# Patient Record
Sex: MALE | Race: WHITE | Employment: UNEMPLOYED | URBAN - METROPOLITAN AREA
[De-identification: names, ages, dates, MRNs, and addresses within clinical notes are randomized per-mention and may not be internally consistent; named-entity substitution may affect disease eponyms.]

---

## 2019-02-06 ENCOUNTER — HOSPITAL ENCOUNTER (OUTPATIENT)
Age: 59
Discharge: HOME | End: 2019-02-06
Payer: COMMERCIAL

## 2019-02-06 ENCOUNTER — HOSPITAL ENCOUNTER (OUTPATIENT)
Dept: ULTRASOUND IMAGING | Age: 59
Discharge: HOME | End: 2019-02-06
Payer: COMMERCIAL

## 2019-02-06 DIAGNOSIS — E87.5 ACUTE HYPERKALEMIA: ICD-10-CM

## 2019-02-06 DIAGNOSIS — N18.30 CHRONIC KIDNEY DISEASE, STAGE III (MODERATE) (HCC): ICD-10-CM

## 2019-02-06 LAB
ALBUMIN SERPL-MCNC: 4.2 GM/DL (ref 3.4–5)
ANION GAP SERPL CALCULATED.3IONS-SCNC: 15 MMOL/L (ref 4–16)
BACTERIA: ABNORMAL /HPF
BASOPHILS ABSOLUTE: 0.1 K/CU MM
BASOPHILS RELATIVE PERCENT: 0.8 % (ref 0–1)
BILIRUBIN URINE: NEGATIVE MG/DL
BLOOD, URINE: ABNORMAL
BUN BLDV-MCNC: 16 MG/DL (ref 6–23)
CALCIUM SERPL-MCNC: 8.8 MG/DL (ref 8.3–10.6)
CHLORIDE BLD-SCNC: 97 MMOL/L (ref 99–110)
CLARITY: CLEAR
CO2: 23 MMOL/L (ref 21–32)
COLOR: COLORLESS
CREAT SERPL-MCNC: 1.5 MG/DL (ref 0.9–1.3)
CREATININE URINE: 36.3 MG/DL (ref 39–259)
DIFFERENTIAL TYPE: ABNORMAL
EOSINOPHILS ABSOLUTE: 0.2 K/CU MM
EOSINOPHILS RELATIVE PERCENT: 1.5 % (ref 0–3)
GFR AFRICAN AMERICAN: 58 ML/MIN/1.73M2
GFR NON-AFRICAN AMERICAN: 48 ML/MIN/1.73M2
GLUCOSE BLD-MCNC: 85 MG/DL (ref 70–99)
GLUCOSE, URINE: NEGATIVE MG/DL
HAV IGM SER IA-ACNC: NON REACTIVE
HCT VFR BLD CALC: 42.3 % (ref 42–52)
HEMOGLOBIN: 13.2 GM/DL (ref 13.5–18)
HEPATITIS B CORE IGM ANTIBODY: NON REACTIVE
HEPATITIS B SURFACE ANTIGEN: NON REACTIVE
HEPATITIS C ANTIBODY: NON REACTIVE
IMMATURE NEUTROPHIL %: 0.7 % (ref 0–0.43)
KETONES, URINE: NEGATIVE MG/DL
LEUKOCYTE ESTERASE, URINE: NEGATIVE
LYMPHOCYTES ABSOLUTE: 3.2 K/CU MM
LYMPHOCYTES RELATIVE PERCENT: 27 % (ref 24–44)
MCH RBC QN AUTO: 29.3 PG (ref 27–31)
MCHC RBC AUTO-ENTMCNC: 31.2 % (ref 32–36)
MCV RBC AUTO: 94 FL (ref 78–100)
MONOCYTES ABSOLUTE: 1.1 K/CU MM
MONOCYTES RELATIVE PERCENT: 9 % (ref 0–4)
MUCUS: ABNORMAL HPF
NITRITE URINE, QUANTITATIVE: NEGATIVE
NUCLEATED RBC %: 0 %
PDW BLD-RTO: 13.2 % (ref 11.7–14.9)
PH, URINE: 6 (ref 5–8)
PHOSPHORUS: 4.5 MG/DL (ref 2.5–4.9)
PLATELET # BLD: 330 K/CU MM (ref 140–440)
PMV BLD AUTO: 9.7 FL (ref 7.5–11.1)
POTASSIUM SERPL-SCNC: 4.5 MMOL/L (ref 3.5–5.1)
PROLACTIN: 13.2 NG/ML
PROSTATE SPECIFIC ANTIGEN: 1 NG/ML (ref 0–4)
PROT/CREAT RATIO, UR: 0.1
PROTEIN UA: NEGATIVE MG/DL
RBC # BLD: 4.5 M/CU MM (ref 4.6–6.2)
RBC URINE: <1 /HPF (ref 0–3)
SEGMENTED NEUTROPHILS ABSOLUTE COUNT: 7.3 K/CU MM
SEGMENTED NEUTROPHILS RELATIVE PERCENT: 61 % (ref 36–66)
SODIUM BLD-SCNC: 135 MMOL/L (ref 135–145)
SPECIFIC GRAVITY UA: 1 (ref 1–1.03)
SQUAMOUS EPITHELIAL: <1 /HPF
TOTAL IMMATURE NEUTOROPHIL: 0.08 K/CU MM
TOTAL NUCLEATED RBC: 0 K/CU MM
TRANSITIONAL EPITHELIAL: <1 /HPF
TRICHOMONAS: ABNORMAL /HPF
URINE TOTAL PROTEIN: 4 MG/DL
UROBILINOGEN, URINE: NORMAL MG/DL (ref 0.2–1)
WBC # BLD: 12 K/CU MM (ref 4–10.5)
WBC UA: 1 /HPF (ref 0–2)

## 2019-02-06 PROCEDURE — 84156 ASSAY OF PROTEIN URINE: CPT

## 2019-02-06 PROCEDURE — 36415 COLL VENOUS BLD VENIPUNCTURE: CPT

## 2019-02-06 PROCEDURE — 84165 PROTEIN E-PHORESIS SERUM: CPT

## 2019-02-06 PROCEDURE — 84146 ASSAY OF PROLACTIN: CPT

## 2019-02-06 PROCEDURE — 76770 US EXAM ABDO BACK WALL COMP: CPT

## 2019-02-06 PROCEDURE — G0103 PSA SCREENING: HCPCS

## 2019-02-06 PROCEDURE — 80074 ACUTE HEPATITIS PANEL: CPT

## 2019-02-06 PROCEDURE — 85025 COMPLETE CBC W/AUTO DIFF WBC: CPT

## 2019-02-06 PROCEDURE — 82570 ASSAY OF URINE CREATININE: CPT

## 2019-02-06 PROCEDURE — 81001 URINALYSIS AUTO W/SCOPE: CPT

## 2019-02-06 PROCEDURE — 80069 RENAL FUNCTION PANEL: CPT

## 2019-02-08 LAB
ALBUMIN ELP: 3.5 GM/DL (ref 3.2–5.6)
ALPHA-1-GLOBULIN: 0.3 GM/DL (ref 0.1–0.4)
ALPHA-2-GLOBULIN: 0.8 GM/DL (ref 0.4–1.2)
BETA GLOBULIN: 1 GM/DL (ref 0.5–1.3)
GAMMA GLOBULIN: 1 GM/DL (ref 0.5–1.6)
TOTAL PROTEIN: 6.7 GM/DL (ref 6.4–8.2)

## 2022-08-17 ENCOUNTER — APPOINTMENT (OUTPATIENT)
Dept: CT IMAGING | Age: 62
End: 2022-08-17
Payer: COMMERCIAL

## 2022-08-17 ENCOUNTER — HOSPITAL ENCOUNTER (EMERGENCY)
Age: 62
Discharge: HOME | End: 2022-08-17
Attending: STUDENT IN AN ORGANIZED HEALTH CARE EDUCATION/TRAINING PROGRAM
Payer: COMMERCIAL

## 2022-08-17 VITALS
TEMPERATURE: 98.4 F | HEIGHT: 66 IN | SYSTOLIC BLOOD PRESSURE: 145 MMHG | HEART RATE: 96 BPM | WEIGHT: 158 LBS | DIASTOLIC BLOOD PRESSURE: 101 MMHG | OXYGEN SATURATION: 98 % | BODY MASS INDEX: 25.39 KG/M2 | RESPIRATION RATE: 18 BRPM

## 2022-08-17 DIAGNOSIS — G40.919 BREAKTHROUGH SEIZURE (HCC): Primary | ICD-10-CM

## 2022-08-17 LAB
ALBUMIN SERPL-MCNC: 4.9 GM/DL (ref 3.4–5)
ALP BLD-CCNC: 126 IU/L (ref 40–129)
ALT SERPL-CCNC: 22 U/L (ref 10–40)
ANION GAP SERPL CALCULATED.3IONS-SCNC: 20 MMOL/L (ref 4–16)
APTT: 25.3 SECONDS (ref 25.1–37.1)
AST SERPL-CCNC: 25 IU/L (ref 15–37)
BASOPHILS ABSOLUTE: 0.1 K/CU MM
BASOPHILS RELATIVE PERCENT: 0.8 % (ref 0–1)
BILIRUB SERPL-MCNC: 0.4 MG/DL (ref 0–1)
BUN BLDV-MCNC: 10 MG/DL (ref 6–23)
CALCIUM SERPL-MCNC: 10.1 MG/DL (ref 8.3–10.6)
CHLORIDE BLD-SCNC: 92 MMOL/L (ref 99–110)
CO2: 18 MMOL/L (ref 21–32)
CREAT SERPL-MCNC: 1.3 MG/DL (ref 0.9–1.3)
DIFFERENTIAL TYPE: ABNORMAL
EOSINOPHILS ABSOLUTE: 0.1 K/CU MM
EOSINOPHILS RELATIVE PERCENT: 1.2 % (ref 0–3)
GFR AFRICAN AMERICAN: >60 ML/MIN/1.73M2
GFR NON-AFRICAN AMERICAN: 56 ML/MIN/1.73M2
GLUCOSE BLD-MCNC: 125 MG/DL (ref 70–99)
GLUCOSE BLD-MCNC: 132 MG/DL (ref 70–99)
HCT VFR BLD CALC: 41 % (ref 42–52)
HEMOGLOBIN: 14.1 GM/DL (ref 13.5–18)
IMMATURE NEUTROPHIL %: 0.8 % (ref 0–0.43)
INR BLD: 0.93 INDEX
LYMPHOCYTES ABSOLUTE: 3.8 K/CU MM
LYMPHOCYTES RELATIVE PERCENT: 32.6 % (ref 24–44)
MAGNESIUM: 1.6 MG/DL (ref 1.8–2.4)
MCH RBC QN AUTO: 30.3 PG (ref 27–31)
MCHC RBC AUTO-ENTMCNC: 34.4 % (ref 32–36)
MCV RBC AUTO: 88.2 FL (ref 78–100)
MONOCYTES ABSOLUTE: 1.1 K/CU MM
MONOCYTES RELATIVE PERCENT: 9.1 % (ref 0–4)
NUCLEATED RBC %: 0 %
PDW BLD-RTO: 13.2 % (ref 11.7–14.9)
PLATELET # BLD: 364 K/CU MM (ref 140–440)
PMV BLD AUTO: 9.4 FL (ref 7.5–11.1)
POTASSIUM SERPL-SCNC: 3.5 MMOL/L (ref 3.5–5.1)
PROTHROMBIN TIME: 12 SECONDS (ref 11.7–14.5)
RBC # BLD: 4.65 M/CU MM (ref 4.6–6.2)
SEGMENTED NEUTROPHILS ABSOLUTE COUNT: 6.6 K/CU MM
SEGMENTED NEUTROPHILS RELATIVE PERCENT: 55.5 % (ref 36–66)
SODIUM BLD-SCNC: 130 MMOL/L (ref 135–145)
TOTAL IMMATURE NEUTOROPHIL: 0.09 K/CU MM
TOTAL NUCLEATED RBC: 0 K/CU MM
TOTAL PROTEIN: 8.1 GM/DL (ref 6.4–8.2)
TROPONIN T: <0.01 NG/ML
WBC # BLD: 11.8 K/CU MM (ref 4–10.5)

## 2022-08-17 PROCEDURE — 83735 ASSAY OF MAGNESIUM: CPT

## 2022-08-17 PROCEDURE — 82962 GLUCOSE BLOOD TEST: CPT

## 2022-08-17 PROCEDURE — 85730 THROMBOPLASTIN TIME PARTIAL: CPT

## 2022-08-17 PROCEDURE — 93005 ELECTROCARDIOGRAM TRACING: CPT | Performed by: STUDENT IN AN ORGANIZED HEALTH CARE EDUCATION/TRAINING PROGRAM

## 2022-08-17 PROCEDURE — 85610 PROTHROMBIN TIME: CPT

## 2022-08-17 PROCEDURE — 70450 CT HEAD/BRAIN W/O DYE: CPT

## 2022-08-17 PROCEDURE — 99284 EMERGENCY DEPT VISIT MOD MDM: CPT

## 2022-08-17 PROCEDURE — 80053 COMPREHEN METABOLIC PANEL: CPT

## 2022-08-17 PROCEDURE — 85025 COMPLETE CBC W/AUTO DIFF WBC: CPT

## 2022-08-17 PROCEDURE — 84484 ASSAY OF TROPONIN QUANT: CPT

## 2022-08-17 RX ORDER — ATORVASTATIN CALCIUM 80 MG/1
TABLET, FILM COATED ORAL
COMMUNITY
Start: 2022-08-08

## 2022-08-17 RX ORDER — ALPRAZOLAM 0.5 MG/1
TABLET ORAL
COMMUNITY
Start: 2022-08-05

## 2022-08-17 RX ORDER — AMITRIPTYLINE HYDROCHLORIDE 100 MG/1
TABLET, FILM COATED ORAL
COMMUNITY

## 2022-08-17 RX ORDER — OMEPRAZOLE 20 MG/1
CAPSULE, DELAYED RELEASE ORAL
COMMUNITY

## 2022-08-17 RX ORDER — OMEGA-3-ACID ETHYL ESTERS 1 G/1
CAPSULE, LIQUID FILLED ORAL
COMMUNITY
Start: 2022-08-05

## 2022-08-17 RX ORDER — ADHESIVE TAPE 3"X 2.3 YD
1 TAPE, NON-MEDICATED TOPICAL DAILY
Qty: 5 TABLET | Refills: 0 | Status: SHIPPED | OUTPATIENT
Start: 2022-08-17 | End: 2022-09-08

## 2022-08-17 RX ORDER — POLYETHYLENE GLYCOL 3350 17 G/17G
POWDER, FOR SOLUTION ORAL
COMMUNITY

## 2022-08-17 RX ORDER — CARVEDILOL 12.5 MG/1
TABLET ORAL
COMMUNITY
Start: 2022-07-26

## 2022-08-17 RX ORDER — HYDROCHLOROTHIAZIDE 12.5 MG/1
CAPSULE, GELATIN COATED ORAL
COMMUNITY
Start: 2022-07-26

## 2022-08-17 RX ORDER — FLUTICASONE FUROATE, UMECLIDINIUM BROMIDE AND VILANTEROL TRIFENATATE 100; 62.5; 25 UG/1; UG/1; UG/1
POWDER RESPIRATORY (INHALATION)
COMMUNITY
Start: 2022-08-05

## 2022-08-17 RX ORDER — LORATADINE 10 MG/1
TABLET ORAL
COMMUNITY
Start: 2022-08-04

## 2022-08-17 ASSESSMENT — PAIN - FUNCTIONAL ASSESSMENT: PAIN_FUNCTIONAL_ASSESSMENT: NONE - DENIES PAIN

## 2022-08-17 NOTE — ED PROVIDER NOTES
Emergency Department Encounter    Patient: Janelle Ragland  MRN: [id number]  : 1960  Date of Evaluation: 2022  ED Provider:  Ponce Phillips DO    Triage Chief Complaint:   Seizures    [place]:  Janelle Ragland is a 59 y.o. male with a past medical history of bipolar disorder, hypertension, hyperlipidemia, that presents to the ED with a history of a witnessed seizure episode. Sister and partner by the bedside and assisting with the history. Patient seen having a generalized tonic-clonic seizures before being helped to the bed. Patient does not remember exactly what happened to him. Patient denies any headache, dizziness, focal neurologic deficits, fever, or any recent history of similar symptoms. ROS - see HPI, below listed is current ROS at time of my eval:  General:  No fevers, no chills, no weakness  Eyes:  No recent vison changes, no discharge  ENT:  No sore throat, no nasal congestion, no hearing changes  Cardiovascular:  No chest pain, no palpitations  Respiratory:  No shortness of breath, no cough, no wheezing  Gastrointestinal:  No pain, no nausea, no vomiting, no diarrhea  Musculoskeletal:  No muscle pain, no joint pain  Skin:  No rash, no pruritis, no easy bruising  Neurologic: Seizure  Psychiatric:  No anxiety  Genitourinary:  No dysuria, no hematuria  Endocrine:  No unexpected weight gain, no unexpected weight loss  Extremities:  no edema, no pain    Past Medical History:   Diagnosis Date    Bipolar 1 disorder ([facility] [address]     Hyperlipidemia     Hypertension     Seizures ([facility] [address]      Past Surgical History:   Procedure Laterality Date    KIDNEY STONE REMOVAL       History reviewed. No pertinent family history.   Social History     Socioeconomic History    Marital status:      Spouse name: Not on file    Number of children: Not on file    Years of education: Not on file    Highest education level: Not on file   Occupational History    Not on file   Tobacco Use    Smoking status: Every Day    Smokeless tobacco: Never   Vaping Use    Vaping Use: Never used   Substance and Sexual Activity    Alcohol use: No    Drug use: Yes     Frequency: 1.0 times per week     Types: Marijuana [place])    Sexual activity: Not on file   Other Topics Concern    Not on file   Social History Narrative    Not on file     Social Determinants of Health     Financial Resource Strain: Not on file   Food Insecurity: Not on file   Transportation Needs: Not on file   Physical Activity: Not on file   Stress: Not on file   Social Connections: Not on file   Intimate Partner Violence: Not on file   Housing Stability: Not on file     No current facility-administered medications for this encounter. Current Outpatient Medications   Medication Sig Dispense Refill    ALPRAZolam (XANAX) 0.5 MG tablet TAKE ONE TABLET BY MOUTH TWICE DAILY      amitriptyline (ELAVIL) 100 MG tablet amitriptyline 100 mg tablet      atorvastatin (LIPITOR) 80 MG tablet take one tablet by mouth EVERY DAY      carvedilol (COREG) 12.5 MG tablet take one tablet by mouth TWICE DAILY      TRELEGY ELLIPTA 100-62.5-25 MCG/INH AEPB INHALE 1 PUFF BY MOUTH ONCE EVERY DAY      hydroCHLOROthiazide (MICROZIDE) 12.5 MG capsule TAKE ONE CAPSULE BY MOUTH EVERY DAY      HM LORATADINE 10 MG tablet TAKE ONE TABLET BY MOUTH EVERY DAY      omega-3 acid ethyl esters (LOVAZA) 1 g capsule TAKE TWO CAPSULES BY MOUTH TWICE DAILY      omeprazole (PRILOSEC) 20 MG delayed release capsule omeprazole 20 mg capsule,delayed release      polyethylene glycol (GLYCOLAX) 17 GM/SCOOP powder polyethylene glycol 3350 17 gram/dose oral powder      triamcinolone (KENALOG) 0.1 % ointment triamcinolone acetonide 0.1 % topical ointment      ALPRAZolam (XANAX) 1 MG tablet Take 1 mg by mouth. amitriptyline (ELAVIL) 10 MG tablet Take 10 mg by mouth nightly. PARoxetine (PAXIL) 10 MG/5ML suspension Take  by mouth every morning.        Allergies   Allergen Reactions    Risperdal [Risperidone] Nursing Notes Reviewed    Physical Exam:  Triage VS:    ED Triage Vitals [08/17/22 1249]   Enc Vitals Group      BP (!) 152/91      Heart Rate (!) 102      Resp 18      Temp 98.4 °F (36.9 °C)      Temp Source Oral      SpO2 97 %      Weight 158 lb (71.7 kg)      Height 5' 6\" (1.676 m)      Head Circumference       Peak Flow       Pain Score       Pain Loc       Pain Edu? Excl. in [address]? My pulse ox interpretation is - normal    General appearance:  No acute distress. Skin:  Warm. Dry. Eye:  Extraocular movements intact. Ears, nose, mouth and throat:  Oral mucosa moist   Neck:  Trachea midline. Extremity:  No swelling. Normal ROM     Heart:  Regular rate and rhythm, normal S1 & S2, no extra heart sounds. Perfusion:  intact  Respiratory:  Lungs clear to auscultation bilaterally. Respirations nonlabored. Abdominal:  Normal bowel sounds. Soft. Nontender. Non distended. Back:  No CVA tenderness to palpation     Neurological:  Alert and oriented times 3. No focal neuro deficits.              Psychiatric:  Appropriate    I have reviewed and interpreted all of the currently available lab results from this visit (if applicable):  Results for orders placed or performed during the hospital encounter of 08/17/22   CBC with Auto Differential   Result Value Ref Range    WBC 11.8 (H) 4.0 - 10.5 K/CU MM    RBC 4.65 4.6 - 6.2 M/CU MM    Hemoglobin 14.1 13.5 - 18.0 GM/DL    Hematocrit 41.0 (L) 42 - 52 %    MCV 88.2 78 - 100 FL    MCH 30.3 27 - 31 PG    MCHC 34.4 32.0 - 36.0 %    RDW 13.2 11.7 - 14.9 %    Platelets 323 039 - 823 K/CU MM    MPV 9.4 7.5 - 11.1 FL    Differential Type AUTOMATED DIFFERENTIAL     Segs Relative 55.5 36 - 66 %    Lymphocytes % 32.6 24 - 44 %    Monocytes % 9.1 (H) 0 - 4 %    Eosinophils % 1.2 0 - 3 %    Basophils % 0.8 0 - 1 %    Segs Absolute 6.6 K/CU MM    Lymphocytes Absolute 3.8 K/CU MM    Monocytes Absolute 1.1 K/CU MM    Eosinophils Absolute 0.1 K/CU MM Basophils Absolute 0.1 K/CU MM    Nucleated RBC % 0.0 %    Total Nucleated RBC 0.0 K/CU MM    Total Immature Neutrophil 0.09 K/CU MM    Immature Neutrophil % 0.8 (H) 0 - 0.43 %   Comprehensive Metabolic Panel w/ Reflex to MG   Result Value Ref Range    Sodium 130 (L) 135 - 145 MMOL/L    Potassium 3.5 3.5 - 5.1 MMOL/L    Chloride 92 (L) 99 - 110 mMol/L    CO2 18 (L) 21 - 32 MMOL/L    BUN 10 6 - 23 MG/DL    Creatinine 1.3 0.9 - 1.3 MG/DL    Glucose 132 (H) 70 - 99 MG/DL    Calcium 10.1 8.3 - 10.6 MG/DL    Albumin 4.9 3.4 - 5.0 GM/DL    Total Protein 8.1 6.4 - 8.2 GM/DL    Total Bilirubin 0.4 0.0 - 1.0 MG/DL    ALT 22 10 - 40 U/L    AST 25 15 - 37 IU/L    Alkaline Phosphatase 126 40 - 129 IU/L    GFR Non- 56 (L) >60 mL/min/1.73m2    GFR African American >60 >60 mL/min/1.73m2    Anion Gap 20 (H) 4 - 16   Troponin   Result Value Ref Range    Troponin T <0.010 <0.01 NG/ML   Protime-INR   Result Value Ref Range    Protime 12.0 11.7 - 14.5 SECONDS    INR 0.93 INDEX   APTT   Result Value Ref Range    aPTT 25.3 25.1 - 37.1 SECONDS   POCT Glucose   Result Value Ref Range    POC Glucose 125 (H) 70 - 99 MG/DL      Radiographs (if obtained):  Radiologist's Report Reviewed:  CT Head WO Contrast   Final Result   No acute intracranial abnormality. Visualized maxillary sinuses demonstrate opacification in keeping with   sinusitis. EKG (if obtained): (All EKG's are interpreted by myself in the absence of a cardiologist)      MDM:  40-year-old male with a past medical history of hypertension hyperlipidemia and a history of seizures but has not had any seizure in over 8 years presented to the ED with a history of a seizure episode. Patient also said to have been clammy and diaphoretic during the seizure. Physical examination suggestive of a postictal state with generalized weakness but no focal neurologic deficits. Is a patient scheduled for CT scan of the head which came up unremarkable.   Blood is drawn for labs    Patient care endorsed to Dr. Mike Mcbride    Clinical Impression:  1. Breakthrough seizure ([facility] [address]      Disposition referral (if applicable):  No follow-up provider specified. Disposition medications (if applicable):  New Prescriptions    No medications on file     ED Provider Disposition Time  DISPOSITION        Comment: Please note this report has been produced using speech recognition software and may contain errors related to that system including errors in grammar, punctuation, and spelling, as well as words and phrases that may be inappropriate. Efforts were made to edit the dictations.         Pippa Donnelly DO  08/17/22 [id number]

## 2022-08-17 NOTE — ED PROVIDER NOTES
Christo Rodríguez was checked out to me by Dr. Orlando Jaquez. Please see his/her initial documentation for details of the patient's ED presentation, physical exam and completed studies. In brief, Christo Rodríguez is a 59 y.o. male that presents with potential breakthrough seizure.     Labs  Results for orders placed or performed during the hospital encounter of 08/17/22   CBC with Auto Differential   Result Value Ref Range    WBC 11.8 (H) 4.0 - 10.5 K/CU MM    RBC 4.65 4.6 - 6.2 M/CU MM    Hemoglobin 14.1 13.5 - 18.0 GM/DL    Hematocrit 41.0 (L) 42 - 52 %    MCV 88.2 78 - 100 FL    MCH 30.3 27 - 31 PG    MCHC 34.4 32.0 - 36.0 %    RDW 13.2 11.7 - 14.9 %    Platelets 148 645 - 822 K/CU MM    MPV 9.4 7.5 - 11.1 FL    Differential Type AUTOMATED DIFFERENTIAL     Segs Relative 55.5 36 - 66 %    Lymphocytes % 32.6 24 - 44 %    Monocytes % 9.1 (H) 0 - 4 %    Eosinophils % 1.2 0 - 3 %    Basophils % 0.8 0 - 1 %    Segs Absolute 6.6 K/CU MM    Lymphocytes Absolute 3.8 K/CU MM    Monocytes Absolute 1.1 K/CU MM    Eosinophils Absolute 0.1 K/CU MM    Basophils Absolute 0.1 K/CU MM    Nucleated RBC % 0.0 %    Total Nucleated RBC 0.0 K/CU MM    Total Immature Neutrophil 0.09 K/CU MM    Immature Neutrophil % 0.8 (H) 0 - 0.43 %   Comprehensive Metabolic Panel w/ Reflex to MG   Result Value Ref Range    Sodium 130 (L) 135 - 145 MMOL/L    Potassium 3.5 3.5 - 5.1 MMOL/L    Chloride 92 (L) 99 - 110 mMol/L    CO2 18 (L) 21 - 32 MMOL/L    BUN 10 6 - 23 MG/DL    Creatinine 1.3 0.9 - 1.3 MG/DL    Glucose 132 (H) 70 - 99 MG/DL    Calcium 10.1 8.3 - 10.6 MG/DL    Albumin 4.9 3.4 - 5.0 GM/DL    Total Protein 8.1 6.4 - 8.2 GM/DL    Total Bilirubin 0.4 0.0 - 1.0 MG/DL    ALT 22 10 - 40 U/L    AST 25 15 - 37 IU/L    Alkaline Phosphatase 126 40 - 129 IU/L    GFR Non- 56 (L) >60 mL/min/1.73m2    GFR African American >60 >60 mL/min/1.73m2    Anion Gap 20 (H) 4 - 16   Troponin   Result Value Ref Range    Troponin T <0.010 <0.01 NG/ML

## 2022-08-17 NOTE — ED NOTES
Discussed patient's prescriptions with patient. No further questions at this time. Instructed patient to call neurology clinic to schedule follow up appointment. Provided patient with phone number and address for neuro clinic. No further questions at this time. Ambulatory at discharge with significant other.       Dolly Silva RN  08/17/22 [id number]

## 2022-08-17 NOTE — ED NOTES
Pt given water with permission from primary RN and Dr. Casandra Quintana, RN  08/17/22 [phone]

## 2022-08-17 NOTE — DISCHARGE INSTRUCTIONS
Please return to the emergency department if you change your mind regarding admission or if symptoms worsen. Please call neurologist tomorrow to schedule appointment even if it is a few months out.

## 2022-08-17 NOTE — ED TRIAGE NOTES
Patient does not remember what happened. Patient's family member says that patient was standing, started 'giggling' and did not recognize her and could not tell her his name. Family said that then he fell onto the bed and she moved him onto his side because he had started shaking. Patient is alert and oriented on arrival to ED. Patient has hx of seizures but has not had one in at least 8 years.

## 2022-08-18 LAB
EKG ATRIAL RATE: 86 BPM
EKG DIAGNOSIS: NORMAL
EKG P AXIS: 38 DEGREES
EKG P-R INTERVAL: 174 MS
EKG Q-T INTERVAL: 384 MS
EKG QRS DURATION: 92 MS
EKG QTC CALCULATION (BAZETT): 459 MS
EKG R AXIS: -34 DEGREES
EKG T AXIS: 21 DEGREES
EKG VENTRICULAR RATE: 86 BPM

## 2022-08-18 PROCEDURE — 93010 ELECTROCARDIOGRAM REPORT: CPT | Performed by: INTERNAL MEDICINE

## 2022-09-08 ENCOUNTER — OFFICE VISIT (OUTPATIENT)
Dept: NEUROLOGY | Age: 62
End: 2022-09-08
Payer: COMMERCIAL

## 2022-09-08 ENCOUNTER — TELEPHONE (OUTPATIENT)
Dept: NEUROLOGY | Age: 62
End: 2022-09-08

## 2022-09-08 VITALS
OXYGEN SATURATION: 95 % | HEART RATE: 73 BPM | WEIGHT: 154.6 LBS | SYSTOLIC BLOOD PRESSURE: 132 MMHG | DIASTOLIC BLOOD PRESSURE: 80 MMHG | BODY MASS INDEX: 24.85 KG/M2 | HEIGHT: 66 IN

## 2022-09-08 DIAGNOSIS — R56.9 SEIZURES (HCC): Primary | ICD-10-CM

## 2022-09-08 PROCEDURE — 99245 OFF/OP CONSLTJ NEW/EST HI 55: CPT | Performed by: PSYCHIATRY & NEUROLOGY

## 2022-09-08 PROCEDURE — G8427 DOCREV CUR MEDS BY ELIG CLIN: HCPCS | Performed by: PSYCHIATRY & NEUROLOGY

## 2022-09-08 PROCEDURE — G8420 CALC BMI NORM PARAMETERS: HCPCS | Performed by: PSYCHIATRY & NEUROLOGY

## 2022-09-08 RX ORDER — ERGOCALCIFEROL 1.25 MG/1
CAPSULE ORAL
COMMUNITY
Start: 2022-08-24

## 2022-09-08 RX ORDER — AMITRIPTYLINE HYDROCHLORIDE 150 MG/1
TABLET, FILM COATED ORAL
COMMUNITY
Start: 2022-09-03

## 2022-09-20 ENCOUNTER — HOSPITAL ENCOUNTER (OUTPATIENT)
Dept: SLEEP CENTER | Age: 62
Discharge: HOME | End: 2022-09-20
Payer: COMMERCIAL

## 2022-09-20 DIAGNOSIS — R56.9 SEIZURES (HCC): ICD-10-CM

## 2022-09-20 PROCEDURE — 95819 EEG AWAKE AND ASLEEP: CPT

## 2022-09-27 PROCEDURE — 95816 EEG AWAKE AND DROWSY: CPT | Performed by: STUDENT IN AN ORGANIZED HEALTH CARE EDUCATION/TRAINING PROGRAM

## 2022-09-27 NOTE — PROCEDURES
channels of EEG were recorded in a digital format on a patient who was reported to be awake and drowsy during the recording. The patient was not sleep deprived prior to the EEG. The PDR consisted of well-developed, well-regulated 8-9 Hz alpha activity, maximal over the posterior head regions and reactive to eye opening and closure. Photic stimulation was performed and did not produce any abnormalities. During the recording stage II sleep was not seen, but drowsiness did occur. The EKG lead revealed no rhythm abnormalities. EEG Interpretation: This EEG was within normal limits for a patient of this age in the awake and drowsy states. No focal, lateralizing, or epileptiform features were seen during the recording. Clinical correlation is recommended.     Gadiel Reilly DO  Epileptologist  9/27/2022 11:46 AM
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
## 2022-11-13 ENCOUNTER — HOSPITAL ENCOUNTER (OUTPATIENT)
Age: 62
Setting detail: OBSERVATION
Discharge: HOME | End: 2022-11-15
Attending: EMERGENCY MEDICINE
Payer: COMMERCIAL

## 2022-11-13 ENCOUNTER — APPOINTMENT (OUTPATIENT)
Dept: GENERAL RADIOLOGY | Age: 62
End: 2022-11-13
Payer: COMMERCIAL

## 2022-11-13 ENCOUNTER — APPOINTMENT (OUTPATIENT)
Dept: CT IMAGING | Age: 62
End: 2022-11-13
Payer: COMMERCIAL

## 2022-11-13 DIAGNOSIS — E83.42 HYPOMAGNESEMIA: ICD-10-CM

## 2022-11-13 DIAGNOSIS — F41.9 ANXIETY: ICD-10-CM

## 2022-11-13 DIAGNOSIS — R56.9 SEIZURE (HCC): Primary | ICD-10-CM

## 2022-11-13 DIAGNOSIS — G40.909 SEIZURE DISORDER (HCC): ICD-10-CM

## 2022-11-13 DIAGNOSIS — N17.9 ACUTE KIDNEY INJURY (HCC): ICD-10-CM

## 2022-11-13 DIAGNOSIS — I10 ESSENTIAL HYPERTENSION: ICD-10-CM

## 2022-11-13 LAB
ALBUMIN SERPL-MCNC: 4.5 GM/DL (ref 3.4–5)
ALCOHOL SCREEN SERUM: <0.01 %WT/VOL
ALP BLD-CCNC: 129 IU/L (ref 40–129)
ALT SERPL-CCNC: 28 U/L (ref 10–40)
AMPHETAMINES: NEGATIVE
ANION GAP SERPL CALCULATED.3IONS-SCNC: 23 MMOL/L (ref 4–16)
AST SERPL-CCNC: 29 IU/L (ref 15–37)
BACTERIA: NEGATIVE /HPF
BARBITURATE SCREEN URINE: NEGATIVE
BASOPHILS ABSOLUTE: 0.1 K/CU MM
BASOPHILS RELATIVE PERCENT: 0.7 % (ref 0–1)
BENZODIAZEPINE SCREEN, URINE: NEGATIVE
BILIRUB SERPL-MCNC: 0.5 MG/DL (ref 0–1)
BILIRUBIN URINE: NEGATIVE MG/DL
BLOOD, URINE: ABNORMAL
BUN BLDV-MCNC: 9 MG/DL (ref 6–23)
CALCIUM SERPL-MCNC: 10.2 MG/DL (ref 8.3–10.6)
CANNABINOID SCREEN URINE: ABNORMAL
CHLORIDE BLD-SCNC: 93 MMOL/L (ref 99–110)
CLARITY: CLEAR
CO2: 17 MMOL/L (ref 21–32)
COCAINE METABOLITE: NEGATIVE
COLOR: YELLOW
CREAT SERPL-MCNC: 1.4 MG/DL (ref 0.9–1.3)
DIFFERENTIAL TYPE: ABNORMAL
EOSINOPHILS ABSOLUTE: 0.2 K/CU MM
EOSINOPHILS RELATIVE PERCENT: 1.7 % (ref 0–3)
GFR SERPL CREATININE-BSD FRML MDRD: 56 ML/MIN/1.73M2
GLUCOSE BLD-MCNC: 158 MG/DL (ref 70–99)
GLUCOSE, URINE: NEGATIVE MG/DL
HCT VFR BLD CALC: 40.5 % (ref 42–52)
HEMOGLOBIN: 13.7 GM/DL (ref 13.5–18)
IMMATURE NEUTROPHIL %: 0.9 % (ref 0–0.43)
KETONES, URINE: NEGATIVE MG/DL
LACTATE: 1.4 MMOL/L (ref 0.4–2)
LACTATE: 7.8 MMOL/L (ref 0.4–2)
LEUKOCYTE ESTERASE, URINE: NEGATIVE
LYMPHOCYTES ABSOLUTE: 3.8 K/CU MM
LYMPHOCYTES RELATIVE PERCENT: 27.5 % (ref 24–44)
MAGNESIUM: 1.6 MG/DL (ref 1.8–2.4)
MAGNESIUM: 1.8 MG/DL (ref 1.8–2.4)
MCH RBC QN AUTO: 29.6 PG (ref 27–31)
MCHC RBC AUTO-ENTMCNC: 33.8 % (ref 32–36)
MCV RBC AUTO: 87.5 FL (ref 78–100)
MONOCYTES ABSOLUTE: 1.1 K/CU MM
MONOCYTES RELATIVE PERCENT: 7.7 % (ref 0–4)
MUCUS: ABNORMAL HPF
NITRITE URINE, QUANTITATIVE: NEGATIVE
NUCLEATED RBC %: 0 %
OPIATES, URINE: NEGATIVE
OXYCODONE: NEGATIVE
PDW BLD-RTO: 13.6 % (ref 11.7–14.9)
PH, URINE: 7 (ref 5–8)
PHENCYCLIDINE, URINE: NEGATIVE
PLATELET # BLD: 353 K/CU MM (ref 140–440)
PMV BLD AUTO: 8.7 FL (ref 7.5–11.1)
POTASSIUM SERPL-SCNC: 3.9 MMOL/L (ref 3.5–5.1)
PROTEIN UA: NEGATIVE MG/DL
RBC # BLD: 4.63 M/CU MM (ref 4.6–6.2)
RBC URINE: 2 /HPF (ref 0–3)
SEGMENTED NEUTROPHILS ABSOLUTE COUNT: 8.5 K/CU MM
SEGMENTED NEUTROPHILS RELATIVE PERCENT: 61.5 % (ref 36–66)
SODIUM BLD-SCNC: 133 MMOL/L (ref 135–145)
SPECIFIC GRAVITY UA: 1.02 (ref 1–1.03)
TOTAL IMMATURE NEUTOROPHIL: 0.13 K/CU MM
TOTAL NUCLEATED RBC: 0 K/CU MM
TOTAL PROTEIN: 8 GM/DL (ref 6.4–8.2)
TRICHOMONAS: ABNORMAL /HPF
UROBILINOGEN, URINE: 0.2 MG/DL (ref 0.2–1)
WBC # BLD: 13.8 K/CU MM (ref 4–10.5)
WBC UA: <1 /HPF (ref 0–2)

## 2022-11-13 PROCEDURE — 6360000002 HC RX W HCPCS

## 2022-11-13 PROCEDURE — G0480 DRUG TEST DEF 1-7 CLASSES: HCPCS

## 2022-11-13 PROCEDURE — 96366 THER/PROPH/DIAG IV INF ADDON: CPT

## 2022-11-13 PROCEDURE — 6360000002 HC RX W HCPCS: Performed by: EMERGENCY MEDICINE

## 2022-11-13 PROCEDURE — 80053 COMPREHEN METABOLIC PANEL: CPT

## 2022-11-13 PROCEDURE — 81003 URINALYSIS AUTO W/O SCOPE: CPT

## 2022-11-13 PROCEDURE — 96372 THER/PROPH/DIAG INJ SC/IM: CPT

## 2022-11-13 PROCEDURE — 83605 ASSAY OF LACTIC ACID: CPT

## 2022-11-13 PROCEDURE — 80307 DRUG TEST PRSMV CHEM ANLYZR: CPT

## 2022-11-13 PROCEDURE — 96367 TX/PROPH/DG ADDL SEQ IV INF: CPT

## 2022-11-13 PROCEDURE — 83735 ASSAY OF MAGNESIUM: CPT

## 2022-11-13 PROCEDURE — 96365 THER/PROPH/DIAG IV INF INIT: CPT

## 2022-11-13 PROCEDURE — 2580000003 HC RX 258: Performed by: EMERGENCY MEDICINE

## 2022-11-13 PROCEDURE — 96375 TX/PRO/DX INJ NEW DRUG ADDON: CPT

## 2022-11-13 PROCEDURE — 71045 X-RAY EXAM CHEST 1 VIEW: CPT

## 2022-11-13 PROCEDURE — 6370000000 HC RX 637 (ALT 250 FOR IP)

## 2022-11-13 PROCEDURE — G0378 HOSPITAL OBSERVATION PER HR: HCPCS

## 2022-11-13 PROCEDURE — 2580000003 HC RX 258

## 2022-11-13 PROCEDURE — 99285 EMERGENCY DEPT VISIT HI MDM: CPT

## 2022-11-13 PROCEDURE — 96361 HYDRATE IV INFUSION ADD-ON: CPT

## 2022-11-13 PROCEDURE — 85025 COMPLETE CBC W/AUTO DIFF WBC: CPT

## 2022-11-13 PROCEDURE — 36415 COLL VENOUS BLD VENIPUNCTURE: CPT

## 2022-11-13 PROCEDURE — 70450 CT HEAD/BRAIN W/O DYE: CPT

## 2022-11-13 RX ORDER — ACETAMINOPHEN 650 MG/1
650 SUPPOSITORY RECTAL EVERY 6 HOURS PRN
Status: DISCONTINUED | OUTPATIENT
Start: 2022-11-13 | End: 2022-11-15 | Stop reason: HOSPADM

## 2022-11-13 RX ORDER — SODIUM CHLORIDE 0.9 % (FLUSH) 0.9 %
5-40 SYRINGE (ML) INJECTION EVERY 12 HOURS SCHEDULED
Status: DISCONTINUED | OUTPATIENT
Start: 2022-11-13 | End: 2022-11-15 | Stop reason: HOSPADM

## 2022-11-13 RX ORDER — LORAZEPAM 2 MG/ML
0.5 INJECTION INTRAMUSCULAR EVERY 5 MIN PRN
Status: DISCONTINUED | OUTPATIENT
Start: 2022-11-13 | End: 2022-11-15 | Stop reason: HOSPADM

## 2022-11-13 RX ORDER — POLYETHYLENE GLYCOL 3350 17 G/17G
17 POWDER, FOR SOLUTION ORAL DAILY PRN
Status: DISCONTINUED | OUTPATIENT
Start: 2022-11-13 | End: 2022-11-15 | Stop reason: HOSPADM

## 2022-11-13 RX ORDER — MAGNESIUM SULFATE IN WATER 40 MG/ML
2000 INJECTION, SOLUTION INTRAVENOUS ONCE
Status: COMPLETED | OUTPATIENT
Start: 2022-11-13 | End: 2022-11-13

## 2022-11-13 RX ORDER — ONDANSETRON 2 MG/ML
4 INJECTION INTRAMUSCULAR; INTRAVENOUS ONCE
Status: COMPLETED | OUTPATIENT
Start: 2022-11-13 | End: 2022-11-13

## 2022-11-13 RX ORDER — ONDANSETRON 4 MG/1
4 TABLET, ORALLY DISINTEGRATING ORAL EVERY 8 HOURS PRN
Status: DISCONTINUED | OUTPATIENT
Start: 2022-11-13 | End: 2022-11-15 | Stop reason: HOSPADM

## 2022-11-13 RX ORDER — ACETAMINOPHEN 325 MG/1
650 TABLET ORAL EVERY 6 HOURS PRN
Status: DISCONTINUED | OUTPATIENT
Start: 2022-11-13 | End: 2022-11-15 | Stop reason: HOSPADM

## 2022-11-13 RX ORDER — 0.9 % SODIUM CHLORIDE 0.9 %
1000 INTRAVENOUS SOLUTION INTRAVENOUS ONCE
Status: COMPLETED | OUTPATIENT
Start: 2022-11-13 | End: 2022-11-13

## 2022-11-13 RX ORDER — LANOLIN ALCOHOL/MO/W.PET/CERES
100 CREAM (GRAM) TOPICAL DAILY
Status: DISCONTINUED | OUTPATIENT
Start: 2022-11-13 | End: 2022-11-15 | Stop reason: HOSPADM

## 2022-11-13 RX ORDER — NICOTINE 21 MG/24HR
1 PATCH, TRANSDERMAL 24 HOURS TRANSDERMAL DAILY
Status: DISCONTINUED | OUTPATIENT
Start: 2022-11-13 | End: 2022-11-15 | Stop reason: HOSPADM

## 2022-11-13 RX ORDER — ONDANSETRON 2 MG/ML
4 INJECTION INTRAMUSCULAR; INTRAVENOUS EVERY 6 HOURS PRN
Status: DISCONTINUED | OUTPATIENT
Start: 2022-11-13 | End: 2022-11-15 | Stop reason: HOSPADM

## 2022-11-13 RX ORDER — ENOXAPARIN SODIUM 100 MG/ML
40 INJECTION SUBCUTANEOUS DAILY
Status: DISCONTINUED | OUTPATIENT
Start: 2022-11-13 | End: 2022-11-15 | Stop reason: HOSPADM

## 2022-11-13 RX ORDER — SODIUM CHLORIDE 9 MG/ML
INJECTION, SOLUTION INTRAVENOUS PRN
Status: DISCONTINUED | OUTPATIENT
Start: 2022-11-13 | End: 2022-11-15 | Stop reason: HOSPADM

## 2022-11-13 RX ORDER — SODIUM CHLORIDE 0.9 % (FLUSH) 0.9 %
5-40 SYRINGE (ML) INJECTION PRN
Status: DISCONTINUED | OUTPATIENT
Start: 2022-11-13 | End: 2022-11-15 | Stop reason: HOSPADM

## 2022-11-13 RX ADMIN — SODIUM CHLORIDE 500 MG: 9 INJECTION, SOLUTION INTRAVENOUS at 23:35

## 2022-11-13 RX ADMIN — SODIUM CHLORIDE, PRESERVATIVE FREE 5 ML: 5 INJECTION INTRAVENOUS at 20:45

## 2022-11-13 RX ADMIN — Medication 100 MG: at 18:29

## 2022-11-13 RX ADMIN — MAGNESIUM SULFATE HEPTAHYDRATE 2000 MG: 2 INJECTION, SOLUTION INTRAVENOUS at 14:57

## 2022-11-13 RX ADMIN — ENOXAPARIN SODIUM 40 MG: 100 INJECTION SUBCUTANEOUS at 17:22

## 2022-11-13 RX ADMIN — SODIUM CHLORIDE 1500 MG: 9 INJECTION, SOLUTION INTRAVENOUS at 12:29

## 2022-11-13 RX ADMIN — SODIUM CHLORIDE 1000 ML: 9 INJECTION, SOLUTION INTRAVENOUS at 12:26

## 2022-11-13 RX ADMIN — ONDANSETRON 4 MG: 2 INJECTION INTRAMUSCULAR; INTRAVENOUS at 12:25

## 2022-11-13 SDOH — HEALTH STABILITY: PHYSICAL HEALTH: ON AVERAGE, HOW MANY DAYS PER WEEK DO YOU ENGAGE IN MODERATE TO STRENUOUS EXERCISE (LIKE A BRISK WALK)?: 0 DAYS

## 2022-11-13 SDOH — HEALTH STABILITY: PHYSICAL HEALTH: ON AVERAGE, HOW MANY MINUTES DO YOU ENGAGE IN EXERCISE AT THIS LEVEL?: 0 MIN

## 2022-11-13 SDOH — ECONOMIC STABILITY: HOUSING INSECURITY
IN THE LAST 12 MONTHS, WAS THERE A TIME WHEN YOU DID NOT HAVE A STEADY PLACE TO SLEEP OR SLEPT IN A SHELTER (INCLUDING NOW)?: NO

## 2022-11-13 SDOH — ECONOMIC STABILITY: TRANSPORTATION INSECURITY
IN THE PAST 12 MONTHS, HAS LACK OF TRANSPORTATION KEPT YOU FROM MEETINGS, WORK, OR FROM GETTING THINGS NEEDED FOR DAILY LIVING?: NO

## 2022-11-13 SDOH — ECONOMIC STABILITY: FOOD INSECURITY: WITHIN THE PAST 12 MONTHS, THE FOOD YOU BOUGHT JUST DIDN'T LAST AND YOU DIDN'T HAVE MONEY TO GET MORE.: NEVER TRUE

## 2022-11-13 SDOH — ECONOMIC STABILITY: INCOME INSECURITY: IN THE LAST 12 MONTHS, WAS THERE A TIME WHEN YOU WERE NOT ABLE TO PAY THE MORTGAGE OR RENT ON TIME?: NO

## 2022-11-13 SDOH — ECONOMIC STABILITY: TRANSPORTATION INSECURITY
IN THE PAST 12 MONTHS, HAS THE LACK OF TRANSPORTATION KEPT YOU FROM MEDICAL APPOINTMENTS OR FROM GETTING MEDICATIONS?: NO

## 2022-11-13 SDOH — ECONOMIC STABILITY: FOOD INSECURITY: WITHIN THE PAST 12 MONTHS, YOU WORRIED THAT YOUR FOOD WOULD RUN OUT BEFORE YOU GOT MONEY TO BUY MORE.: NEVER TRUE

## 2022-11-13 SDOH — ECONOMIC STABILITY: HOUSING INSECURITY: IN THE LAST 12 MONTHS, HOW MANY PLACES HAVE YOU LIVED?: 1

## 2022-11-13 ASSESSMENT — PATIENT HEALTH QUESTIONNAIRE - PHQ9
1. LITTLE INTEREST OR PLEASURE IN DOING THINGS: MORE THAN HALF THE DAYS
8. MOVING OR SPEAKING SO SLOWLY THAT OTHER PEOPLE COULD HAVE NOTICED. OR THE OPPOSITE, BEING SO FIGETY OR RESTLESS THAT YOU HAVE BEEN MOVING AROUND A LOT MORE THAN USUAL: SEVERAL DAYS
4. FEELING TIRED OR HAVING LITTLE ENERGY: MORE THAN HALF THE DAYS
5. POOR APPETITE OR OVEREATING: NOT AT ALL
9. THOUGHTS THAT YOU WOULD BE BETTER OFF DEAD, OR OF HURTING YOURSELF: NOT AT ALL
7. TROUBLE CONCENTRATING ON THINGS, SUCH AS READING THE NEWSPAPER OR WATCHING TELEVISION: NOT AT ALL
3. TROUBLE FALLING OR STAYING ASLEEP: NEARLY EVERY DAY
6. FEELING BAD ABOUT YOURSELF - OR THAT YOU ARE A FAILURE OR HAVE LET YOURSELF OR YOUR FAMILY DOWN: NOT AT ALL
SUM OF ALL RESPONSES TO PHQ QUESTIONS 1-9: 10
10. IF YOU CHECKED OFF ANY PROBLEMS, HOW DIFFICULT HAVE THESE PROBLEMS MADE IT FOR YOU TO DO YOUR WORK, TAKE CARE OF THINGS AT HOME, OR GET ALONG WITH OTHER PEOPLE: SOMEWHAT DIFFICULT
SUM OF ALL RESPONSES TO PHQ9 QUESTIONS 1 & 2: 4
2. FEELING DOWN, DEPRESSED OR HOPELESS: MORE THAN HALF THE DAYS

## 2022-11-13 ASSESSMENT — SOCIAL DETERMINANTS OF HEALTH (SDOH)
WITHIN THE LAST YEAR, HAVE YOU BEEN HUMILIATED OR EMOTIONALLY ABUSED IN OTHER WAYS BY YOUR PARTNER OR EX-PARTNER?: NO
WITHIN THE LAST YEAR, HAVE YOU BEEN AFRAID OF YOUR PARTNER OR EX-PARTNER?: NO
HOW HARD IS IT FOR YOU TO PAY FOR THE VERY BASICS LIKE FOOD, HOUSING, MEDICAL CARE, AND HEATING?: NOT HARD AT ALL
ARE YOU MARRIED, WIDOWED, DIVORCED, SEPARATED, NEVER MARRIED, OR LIVING WITH A PARTNER?: LIVING WITH PARTNER
HOW OFTEN DO YOU ATTENT MEETINGS OF THE CLUB OR ORGANIZATION YOU BELONG TO?: NEVER
DO YOU BELONG TO ANY CLUBS OR ORGANIZATIONS SUCH AS CHURCH GROUPS UNIONS, FRATERNAL OR ATHLETIC GROUPS, OR SCHOOL GROUPS?: NO
HOW OFTEN DO YOU GET TOGETHER WITH FRIENDS OR RELATIVES?: TWICE A WEEK
HOW OFTEN DO YOU ATTEND CHURCH OR RELIGIOUS SERVICES?: NEVER
WITHIN THE LAST YEAR, HAVE YOU BEEN KICKED, HIT, SLAPPED, OR OTHERWISE PHYSICALLY HURT BY YOUR PARTNER OR EX-PARTNER?: NO
IN A TYPICAL WEEK, HOW MANY TIMES DO YOU TALK ON THE PHONE WITH FAMILY, FRIENDS, OR NEIGHBORS?: MORE THAN THREE TIMES A WEEK
WITHIN THE LAST YEAR, HAVE TO BEEN RAPED OR FORCED TO HAVE ANY KIND OF SEXUAL ACTIVITY BY YOUR PARTNER OR EX-PARTNER?: NO

## 2022-11-13 ASSESSMENT — LIFESTYLE VARIABLES
HOW MANY STANDARD DRINKS CONTAINING ALCOHOL DO YOU HAVE ON A TYPICAL DAY: PATIENT DOES NOT DRINK
HOW OFTEN DO YOU HAVE A DRINK CONTAINING ALCOHOL: NEVER

## 2022-11-13 ASSESSMENT — PAIN - FUNCTIONAL ASSESSMENT: PAIN_FUNCTIONAL_ASSESSMENT: NONE - DENIES PAIN

## 2022-11-13 NOTE — ED PROVIDER NOTES
Emergency Department Encounter    Patient: Houston Garcia  MRN: [id number]  : 1960  Date of Evaluation: 2022  ED Provider:  Michele Causey DO    Triage Chief Complaint:   Seizures (Pt post ictal, no seizures witnessed by EMS. Glucose 180, no known fall related to seizure. Positive hx of seizures but is not medicated currently.)    [place]:  oHuston Garcia is a 57 y.o. male that presents to the emergency department for seizure. Patient brought in via EMS. Per EMS glucose of 180. Patient with history of seizure but not on any medication. Wife at the bedside states the seizure happened about 11:00 AM.  She states he had just gotten out of the shower put on close was sitting on the bed watching TV. She states he started talking about a potato was not sounding right. She states you are not feeling well and he shook his head yes. She states she asked him if he wanted her to call 911 and he shook his head yes. She states after that he stiffened up on the bed was foaming at the mouth eyes rolled back of his head. She states he had a seizure before in August seen by neurologist Dr. Glenna Osborn. She states patient was not placed on any seizure medications. She states they noticed yesterday at the grocery store patient states his legs got weak and he almost fell down but his friend had called him. She states it may have been a seizure but she was not there to witness it. Patient complains of headache and nausea at this point. Patient denies any bowel bladder incontinence. Patient no tongue biting does not have any teeth. Wife states he did not fall off the bed or hit his head or anything today. Patient complains of shortness of breath. Denies any chest pain no fever chills or cough. States some mild abdominal pain. Patient here for evaluation.     ROS - see HPI, below listed is current ROS at time of my eval:  General:  No fevers, no chills, no weakness  Eyes:  No recent vison changes, no discharge  ENT:  No sore throat, no nasal congestion, no hearing changes  Cardiovascular:  No chest pain, no palpitations  Respiratory: Positive for shortness of breath, no cough, no wheezing  Gastrointestinal:  No pain, positive for nausea, no vomiting, no diarrhea  Musculoskeletal:  No muscle pain, no joint pain  Skin:  No rash, no pruritis, no easy bruising  Neurologic: Positive for seizure, no speech problems, no headache, no extremity numbness, no extremity tingling, no extremity weakness  Psychiatric:  No anxiety  Genitourinary:  No dysuria, no hematuria  Endocrine:  No unexpected weight gain, no unexpected weight loss  Extremities:  no edema, no pain    Past Medical History:   Diagnosis Date    Bipolar 1 disorder ([facility] [address]     Hyperlipidemia     Hypertension     [facility]      Past Surgical History:   Procedure Laterality Date    KIDNEY STONE REMOVAL       History reviewed. No pertinent family history.   Social History     Socioeconomic History    Marital status:      Spouse name: Not on file    Number of children: Not on file    Years of education: Not on file    Highest education level: Not on file   Occupational History    Not on file   Tobacco Use    Smoking status: Every Day     Packs/day: 1.00     Years: 47.00     Pack years: 47.00     Types: Cigarettes     Start date: 1975    Smokeless tobacco: Never   Vaping Use    Vaping Use: Never used   Substance and Sexual Activity    Alcohol use: No    Drug use: Yes     Frequency: 1.0 times per week     Types: Marijuana Imer Hail)    Sexual activity: Not on file   Other Topics Concern    Not on file   Social History Narrative    Not on file     Social Determinants of Health     Financial Resource Strain: Not on file   Food Insecurity: Not on file   Transportation Needs: Not on file   Physical Activity: Not on file   Stress: Not on file   Social Connections: Not on file   Intimate Partner Violence: Not on file   Housing Stability: Not on file     Current Facility-Administered Medications   Medication Dose Route Frequency Provider Last Rate Last Admin    0.9 % sodium chloride bolus  1,000 mL IntraVENous Once Genoveva Valdivia,         ondansetron [facility]) injection 4 mg  4 mg IntraVENous Once Genoveva More DO        levETIRAcetam (KEPPRA) 1,500 mg in sodium chloride 0.9 % 100 mL IVPB  1,500 mg IntraVENous Q12H Genoveva Valdivia, DO         Current Outpatient Medications   Medication Sig Dispense Refill    VENTOLIN  (90 Base) MCG/ACT inhaler INHALE 2 PUFFS INTO LUNGS EVERY 4 HOURS AS NEEDED      amitriptyline (ELAVIL) 150 MG tablet TAKE ONE TABLET BY MOUTH AT BEDTIME (Patient not taking: Reported on 9/8/2022)      bisacodyl (DULCOLAX) 5 MG EC tablet bisacodyl 5 mg tablet,delayed release (Patient not taking: Reported on 9/8/2022)      vitamin D (ERGOCALCIFEROL) 1.25 MG (87928 UT) CAPS capsule TAKE ONE CAPSULE BY MOUTH WEEKLY      ALPRAZolam (XANAX) 0.5 MG tablet TAKE ONE TABLET BY MOUTH TWICE DAILY (Patient not taking: Reported on 9/8/2022)      amitriptyline (ELAVIL) 100 MG tablet amitriptyline 100 mg tablet      atorvastatin (LIPITOR) 80 MG tablet take one tablet by mouth EVERY DAY      carvedilol (COREG) 12.5 MG tablet take one tablet by mouth TWICE DAILY      TRELEGY ELLIPTA 100-62.5-25 MCG/INH AEPB INHALE 1 PUFF BY MOUTH ONCE EVERY DAY      hydroCHLOROthiazide (MICROZIDE) 12.5 MG capsule TAKE ONE CAPSULE BY MOUTH EVERY DAY      HM LORATADINE 10 MG tablet TAKE ONE TABLET BY MOUTH EVERY DAY      omega-3 acid ethyl esters (LOVAZA) 1 g capsule TAKE TWO CAPSULES BY MOUTH TWICE DAILY      omeprazole (PRILOSEC) 20 MG delayed release capsule omeprazole 20 mg capsule,delayed release      polyethylene glycol (GLYCOLAX) 17 GM/SCOOP powder polyethylene glycol 3350 17 gram/dose oral powder      triamcinolone (KENALOG) 0.1 % ointment triamcinolone acetonide 0.1 % topical ointment      Magnesium Oxide (MAG-OXIDE) 200 MG TABS Take 1 tablet by mouth daily for 5 days 5 tablet 0    ALPRAZolam (XANAX) 1 MG tablet Take 1 mg by mouth. amitriptyline (ELAVIL) 10 MG tablet Take 10 mg by mouth nightly. (Patient not taking: Reported on 9/8/2022)      PARoxetine (PAXIL) 10 MG/5ML suspension Take  by mouth every morning. Allergies   Allergen Reactions    Risperdal [Risperidone]        Nursing Notes Reviewed    Physical Exam:  Triage VS:    ED Triage Vitals [11/13/22 1138]   Enc Vitals Group      BP (!) 173/99      Heart Rate (!) 106      Resp 26      Temp 97.7 °F (36.5 °C)      Temp Source Oral      SpO2 98 %      Weight 162 lb (73.5 kg)      Height 5' 6\" (1.676 m)      Head Circumference       Peak Flow       Pain Score       Pain Loc       Pain Edu? Excl. in [address]? BP (!) 180/101   Pulse 88   Temp 97.7 °F (36.5 °C) (Oral)   Resp 25   Ht 5' 6\" (1.676 m)   Wt 162 lb (73.5 kg)   SpO2 97%   BMI 26.15 kg/m²       My pulse ox interpretation is - normal    General appearance:  No acute distress. Skin:  Warm. Dry. Eye:  Extraocular movements intact. Ears, nose, mouth and throat:  Oral mucosa moist, edentulous, patent oropharynx,   Neck:  Trachea midline. Extremity:  No swelling. Normal ROM     Heart: Sinus tachycardia, normal S1 & S2, no extra heart sounds. Perfusion:  intact  Respiratory:  Lungs clear to auscultation bilaterally. Respirations nonlabored. Abdominal:  Normal bowel sounds. Soft. Nontender. Non distended. Back:  No CVA tenderness to palpation     Neurological: slightly postictal, alert and oriented times 2. No focal neuro deficits.              Psychiatric:  Appropriate    I have reviewed and interpreted all of the currently available lab results from this visit (if applicable):  Results for orders placed or performed during the hospital encounter of 11/13/22   CBC with Auto Differential   Result Value Ref Range    WBC 13.8 (H) 4.0 - 10.5 K/CU MM    RBC 4.63 4.6 - 6.2 M/CU MM    Hemoglobin 13.7 13.5 - 18.0 GM/DL    Hematocrit 40.5 (L) 42 - 52 %    MCV 87.5 78 - 100 FL    MCH 29.6 27 - 31 PG    MCHC 33.8 32.0 - 36.0 %    RDW 13.6 11.7 - 14.9 %    Platelets 427 571 - 422 K/CU MM    MPV 8.7 7.5 - 11.1 FL    Differential Type AUTOMATED DIFFERENTIAL     Segs Relative 61.5 36 - 66 %    Lymphocytes % 27.5 24 - 44 %    Monocytes % 7.7 (H) 0 - 4 %    Eosinophils % 1.7 0 - 3 %    Basophils % 0.7 0 - 1 %    Segs Absolute 8.5 K/CU MM    Lymphocytes Absolute 3.8 K/CU MM    Monocytes Absolute 1.1 K/CU MM    Eosinophils Absolute 0.2 K/CU MM    Basophils Absolute 0.1 K/CU MM    Nucleated RBC % 0.0 %    Total Nucleated RBC 0.0 K/CU MM    Total Immature Neutrophil 0.13 K/CU MM    Immature Neutrophil % 0.9 (H) 0 - 0.43 %   CMP   Result Value Ref Range    Sodium 133 (L) 135 - 145 MMOL/L    Potassium 3.9 3.5 - 5.1 MMOL/L    Chloride 93 (L) 99 - 110 mMol/L    CO2 17 (L) 21 - 32 MMOL/L    BUN 9 6 - 23 MG/DL    Creatinine 1.4 (H) 0.9 - 1.3 MG/DL    Est, Glom Filt Rate 56 (L) >60 mL/min/1.73m2    Glucose 158 (H) 70 - 99 MG/DL    Calcium 10.2 8.3 - 10.6 MG/DL    Albumin 4.5 3.4 - 5.0 GM/DL    Total Protein 8.0 6.4 - 8.2 GM/DL    Total Bilirubin 0.5 0.0 - 1.0 MG/DL    ALT 28 10 - 40 U/L    AST 29 15 - 37 IU/L    Alkaline Phosphatase 129 40 - 129 IU/L    Anion Gap 23 (H) 4 - 16   Magnesium   Result Value Ref Range    Magnesium 1.6 (L) 1.8 - 2.4 mg/dl   ETOH (Select for patients with history of alcohol abuse)   Result Value Ref Range    Alcohol Scrn <0.01 <0.01 %WT/VOL   Lactic Acid   Result Value Ref Range    Lactate 7.8 (HH) 0.4 - 2.0 mMOL/L   Urine Drug Screen   Result Value Ref Range    Cannabinoid Scrn, Ur UNCONFIRMED POSITIVE (A) NEGATIVE    Amphetamines NEGATIVE NEGATIVE    Cocaine Metabolite NEGATIVE NEGATIVE    Benzodiazepine Screen, Urine NEGATIVE NEGATIVE    Barbiturate Screen, Ur NEGATIVE NEGATIVE    Opiates, Urine NEGATIVE NEGATIVE    Phencyclidine, Urine NEGATIVE NEGATIVE    Oxycodone NEGATIVE NEGATIVE   Urinalysis with Reflex to Culture    Specimen: Urine Result Value Ref Range    Color, UA YELLOW YELLOW    Clarity, UA CLEAR CLEAR    Glucose, Urine NEGATIVE NEGATIVE MG/DL    Bilirubin Urine NEGATIVE NEGATIVE MG/DL    Ketones, Urine NEGATIVE NEGATIVE MG/DL    Specific Gravity, UA 1.020 1.001 - 1.035    Blood, Urine SMALL NUMBER OR AMOUNT OBSERVED (A) NEGATIVE    pH, Urine 7.0 5.0 - 8.0    Protein, UA NEGATIVE NEGATIVE MG/DL    Urobilinogen, Urine 0.2 0.2 - 1.0 MG/DL    Nitrite Urine, Quantitative NEGATIVE NEGATIVE    Leukocyte Esterase, Urine NEGATIVE NEGATIVE    RBC, UA 2 0 - 3 /HPF    WBC, UA <1 0 - 2 /HPF    Bacteria, UA NEGATIVE NEGATIVE /HPF    Mucus, UA RARE (A) NEGATIVE HPF    Trichomonas, UA NONE SEEN NONE SEEN /HPF      Radiographs (if obtained):  Radiologist's Report Reviewed:  CT HEAD WO CONTRAST (Select for new onset seizures or head trauma)   Final Result   No acute intracranial abnormality. Chronic bilateral maxillary sinusitis. XR CHEST PORTABLE   Final Result   No acute abnormality. EKG (if obtained): (All EKG's are interpreted by myself in the absence of a cardiologist)  Medications   levETIRAcetam (KEPPRA) 1,500 mg in sodium chloride 0.9 % 100 mL IVPB (0 mg IntraVENous Stopped 11/13/22 1302)   magnesium sulfate 2000 mg in 50 mL IVPB premix (has no administration in time range)   0.9 % sodium chloride bolus (1,000 mLs IntraVENous New Bag 11/13/22 1226)   ondansetron (ZOFRAN) injection 4 mg (4 mg IntraVENous Given 11/13/22 1225)         MDM:  Patient presents to the emergency department status post seizure. Patient is mildly postictal.  Can answer some questions. Complain of nausea headache shortness of breath. Patient seizure witnessed by wife who was sitting on the bed with him. Patient had a seizure back in August but not on any medications. Patient ordered laboratory studies CT scan of the head urine drug screen urinalysis EKG chest x-ray care for Zofran 4 mg IV, Keppra 1500 mg IV, normal saline IV fluids.   Patient elevated white blood cell count 13.8. Patient normal hemoglobin platelets. Patient sodium slightly low 133. Patient normal potassium. Patient mild renal insufficient creatinine 1.4, glucose 138. Patient normal liver enzymes. Patient magnesium slightly low 1.6. Patient negative alcohol level. Patient lactic acid 7.8. Patient CT of the head no acute intracranial abnormality chronic bilateral maxillary sinusitis, chest x-ray no acute abnormality. Did consult nephrology. Hospitalist has been consulted for admission. Patient noted be hypertensive with history of high blood pressure. Unsure if patient took his home medication. We will continue to monitor. I did initiate 2 g of magnesium IV. I did speak with on-call neurologist Dr. Gabriela Thakur.  They will see patient in consultation. Clinical Impression:  1. Seizure ([address]    2. Seizure disorder ([facility] [address]    3. Essential hypertension    4. Acute kidney injury ([facility] [address]    5. Hypomagnesemia          ED Provider Disposition Time  DISPOSITION  2:16 PM        Comment: Please note this report has been produced using speech recognition software and may contain errors related to that system including errors in grammar, punctuation, and spelling, as well as words and phrases that may be inappropriate. Efforts were made to edit the dictations.         Ally Berrios,   11/13/22 5951

## 2022-11-13 NOTE — H&P
V2.0  History and Physical      Name:  Elvia Barragan /Age/Sex: 1960  (57 y.o. male)   MRN & CSN:  [id number] & [id number] Encounter Date/Time: 2022 2:49 PM EST   Location:  ED20/ED-20 PCP: Ibis Banerjee, APRN - [facility] Day: 1    Assessment and Plan:   Elvia Barragan is a 57 y.o. male with a pmh of Bipolar 1 disorder, HLD, HTN, seizure who presents with Seizure [facility]    Hospital Problems             Last Modified POA    * (Principal) Seizure ([facility] [address] 2022 Yes     Seizure like activity- witnessed  --Likely breakthrough  --1500mg Keppra in ED  --IVF  --Keppra 500 mg IV BID  --Neurology consult- Established patient with Dr. Natalie Davey  --Will defer MRI and full EEG to Neuro team; may consider Shereen Cobian as needed  --Seizure precautions  --Neuro checks q 4h  --LA 7.8; Repeat ordered  --Monitor Tele    Hypomagnesia -1.6  --repleted in ED  --Repeat Mag level ordered    Mild Hyponatremia  -- Often seen with history of alcoholism  --IVF  --Repeat CMP in AM    ALYX  --Creatinine 1.4  --likely 2/2 dehydration  --IVF  --Repeat CMP in AM    Alcohol abuse  -Patient drinks 12 pack per month, and reports does not need to drink daily.    --Does not appear to be in withdrawal  --monitor for withdrawal symptoms  --Thiamine supplement ordered  --cessation encouraged    Tobacco abuse   --cessation encouraged  --nicotine patch ordered    HTN  --continue antihypertensive      HLD   --continue statin          Disposition:   Current Living situation: Home with wife  Expected Disposition: Home  Estimated D/C: 1-2 days    Diet No diet orders on file   DVT Prophylaxis [x] Lovenox, []  Heparin, [] SCDs, [] Ambulation,  [] Eliquis, [] Xarelto   Code Status No Order   Surrogate Decision Maker/ POA Self     History from:     patient, spouse, electronic medical record    History of Present Illness:     Chief Complaint:Seizure like activity witnessed by wife  Elvia Barragan is a 57 y.o. male with a pmh of Bipolar 1 disorder, HLD, HTN, seizure who presents with Seizure [facility])  Patient seen at bedside with spouse, who reports that his initial seizure diagnosis was 8/17/2022. Patient's spouse reports that he has had \"mini seizures\" for years, also that he has had a history of alcohol use, however has \"decreased to a 12 pack a month\" This episode, patient \"not making any sense verbally\" was able to nod head yes to spouse asking if he wanted her to call 911. Patient described as violently shaking, foaming from the mouth, for 10 minutes. Patient was described as confused until ED. On exam, patient is awake and alert. No tongue lac, patient does not have teeth or dentures. Patient remembers talking to spouse telling her to call 46, then remembers seeing paramedics. Patient endorses headache to posterior head 5/10 describes as constant pressure. Exam is non focal. CXR non acute;CTH is non acute, neurology has been consulted in ED. Patient is a patient of Dr. Gabriel Medrano, however not prescribed Keppra as outpatient  Review of Systems: Need 10 Elements   Review of Systems   All other systems reviewed and are negative. Objective:   No intake or output data in the 24 hours ending 11/13/22 1511   Vitals:   Vitals:    11/13/22 1142 11/13/22 1202 11/13/22 1247 11/13/22 1432   BP: (!) 173/99 (!) 157/96 (!) 180/101 (!) 156/93   Pulse: (!) 102 94 88 88   Resp: 24 28 25 16   Temp:       TempSrc:       SpO2: 97%  97% 93%   Weight:       Height:           Medications Prior to Admission     Prior to Admission medications    Medication Sig Start Date End Date Taking?  Authorizing Provider   VENTOLIN  (90 Base) MCG/ACT inhaler INHALE 2 PUFFS INTO LUNGS EVERY 4 HOURS AS NEEDED 8/18/22   Historical Provider, MD   amitriptyline (ELAVIL) 150 MG tablet TAKE ONE TABLET BY MOUTH AT BEDTIME  Patient not taking: Reported on 9/8/2022 9/3/22   Historical Provider, MD   bisacodyl (DULCOLAX) 5 MG EC tablet bisacodyl 5 mg tablet,delayed release  Patient not taking: Reported on 9/8/2022    Historical Provider, MD   vitamin D (ERGOCALCIFEROL) 1.25 MG (01815 UT) CAPS capsule TAKE ONE CAPSULE BY MOUTH WEEKLY 8/24/22   Historical Provider, MD   ALPRAZolam Citlaly Cowden) 0.5 MG tablet TAKE ONE TABLET BY MOUTH TWICE DAILY  Patient not taking: Reported on 9/8/2022 8/5/22   Historical Provider, MD   amitriptyline (ELAVIL) 100 MG tablet amitriptyline 100 mg tablet    Historical Provider, MD   atorvastatin (LIPITOR) 80 MG tablet take one tablet by mouth EVERY DAY 8/8/22   Historical Provider, MD   carvedilol (COREG) 12.5 MG tablet take one tablet by mouth TWICE DAILY 7/26/22   Historical Provider, MD   Sung [place] 100-62.5-25 MCG/INH AEPB INHALE 1 PUFF BY MOUTH ONCE EVERY DAY 8/5/22   Historical Provider, MD   hydroCHLOROthiazide (MICROZIDE) 12.5 MG capsule TAKE ONE CAPSULE BY MOUTH EVERY DAY 7/26/22   Historical Provider, MD    LORATADINE 10 MG tablet TAKE ONE TABLET BY MOUTH EVERY DAY 8/4/22   Historical Provider, MD   omega-3 acid ethyl esters (LOVAZA) 1 g capsule TAKE TWO CAPSULES BY MOUTH TWICE DAILY 8/5/22   Historical Provider, MD   omeprazole (PRILOSEC) 20 MG delayed release capsule omeprazole 20 mg capsule,delayed release    Historical Provider, MD   polyethylene glycol (GLYCOLAX) 17 GM/SCOOP powder polyethylene glycol 3350 17 gram/dose oral powder    Historical Provider, MD   triamcinolone (KENALOG) 0.1 % ointment triamcinolone acetonide 0.1 % topical ointment    Historical Provider, MD   Magnesium Oxide (MAG-OXIDE) 200 MG TABS Take 1 tablet by mouth daily for 5 days 8/17/22 9/8/22  Linda Pro MD   ALPRAZolam Citlaly Cowden) 1 MG tablet Take 1 mg by mouth. Historical Provider, MD   amitriptyline (ELAVIL) 10 MG tablet Take 10 mg by mouth nightly. Patient not taking: Reported on 9/8/2022    Historical Provider, MD   PARoxetine (PAXIL) 10 MG/5ML suspension Take  by mouth every morning.     Historical Provider, MD       Physical Exam: Need 8 Elements   Physical Exam  Constitutional: Appearance: Normal appearance. HENT:      Head: Normocephalic. Nose: Nose normal.   Cardiovascular:      Rate and Rhythm: Normal rate. Pulmonary:      Effort: Pulmonary effort is normal.      Breath sounds: Normal breath sounds. Abdominal:      General: Bowel sounds are normal.   Musculoskeletal:         General: Tenderness present. Cervical back: Neck supple. Comments: \"Sore legs\"   Skin:     General: Skin is warm and dry. Neurological:      General: No focal deficit present. Mental Status: He is alert and oriented to person, place, and time. Mental status is at baseline. Psychiatric:         Mood and Affect: Mood normal.        Past Medical History:   PMHx   Past Medical History:   Diagnosis Date    Bipolar 1 disorder ([facility] [address]     Hyperlipidemia     Hypertension     Seizures ([facility] [address]      PSHX:  has a past surgical history that includes Kidney stone removal.  Allergies: Allergies   Allergen Reactions    Risperdal [Risperidone]      Fam HX: family history is not on file.   Soc HX:   Social History     Socioeconomic History    Marital status:      Spouse name: None    Number of children: None    Years of education: None    Highest education level: None   Tobacco Use    Smoking status: Every Day     Packs/day: 1.00     Years: 47.00     Pack years: 47.00     Types: Cigarettes     Start date: 1975    Smokeless tobacco: Never   Vaping Use    Vaping Use: Never used   Substance and Sexual Activity    Alcohol use: No    Drug use: Yes     Frequency: 1.0 times per week     Types: Marijuana (Weed)       Medications:   Medications:    levETIRAcetam  1,500 mg IntraVENous Q12H    magnesium sulfate  2,000 mg IntraVENous Once      Infusions:   PRN Meds:      Labs      CBC:   Recent Labs     11/13/22  1155   WBC 13.8*   HGB 13.7        BMP:    Recent Labs     11/13/22  1155   *   K 3.9   CL 93*   CO2 17*   BUN 9   CREATININE 1.4*   GLUCOSE 158*     Hepatic:   Recent Labs 11/13/22  1155   AST 29   ALT 28   BILITOT 0.5   ALKPHOS 129     Lipids: No results found for: CHOL, HDL, TRIG  Hemoglobin A1C: No results found for: LABA1C  TSH: No results found for: TSH  Troponin:   Lab Results   Component Value Date/Time    TROPONINT <0.010 08/17/2022 02:50 PM     Lactic Acid: No results for input(s): LACTA in the last 72 hours. BNP: No results for input(s): PROBNP in the last 72 hours. UA:  Lab Results   Component Value Date/Time    NITRU NEGATIVE 02/06/2019 08:40 AM    COLORU COLORLESS 02/06/2019 08:40 AM    WBCUA 1 02/06/2019 08:40 AM    RBCUA <1 02/06/2019 08:40 AM    MUCUS RARE 02/06/2019 08:40 AM    TRICHOMONAS NONE SEEN 02/06/2019 08:40 AM    BACTERIA RARE 02/06/2019 08:40 AM    CLARITYU CLEAR 02/06/2019 08:40 AM    SPECGRAV 1.002 02/06/2019 08:40 AM    LEUKOCYTESUR NEGATIVE 02/06/2019 08:40 AM    UROBILINOGEN NORMAL 02/06/2019 08:40 AM    BILIRUBINUR NEGATIVE 02/06/2019 08:40 AM    BLOODU SMALL 02/06/2019 08:40 AM    KETUA NEGATIVE 02/06/2019 08:40 AM     Urine Cultures: No results found for: Ferol Kilove  Blood Cultures: No results found for: BC  No results found for: BLOODCULT2  Organism: No results found for: ORG    Imaging/Diagnostics Last 24 Hours   CT HEAD WO CONTRAST (Select for new onset seizures or head trauma)    Result Date: 11/13/2022  EXAMINATION: CT OF THE HEAD WITHOUT CONTRAST  11/13/2022 12:35 pm TECHNIQUE: CT of the head was performed without the administration of intravenous contrast. Automated exposure control, iterative reconstruction, and/or weight based adjustment of the mA/kV was utilized to reduce the radiation dose to as low as reasonably achievable. COMPARISON: 08/17/2022 HISTORY: ORDERING SYSTEM PROVIDED HISTORY: seizure TECHNOLOGIST PROVIDED HISTORY: Has a \"code stroke\" or \"stroke alert\" been called? ->No Reason for exam:->seizure Decision Support Exception - unselect if not a suspected or confirmed emergency medical condition->Emergency Medical Condition (MA) Reason for Exam: seizure FINDINGS: BRAIN/VENTRICLES: There is no acute intracranial hemorrhage, mass effect or midline shift. No abnormal extra-axial fluid collection. The gray-white differentiation is maintained without evidence of an acute infarct. There is no evidence of hydrocephalus. ORBITS: The visualized portion of the orbits demonstrate no acute abnormality. SINUSES: There is evidence of chronic bilateral maxillary sinusitis. SOFT TISSUES/SKULL:  No acute abnormality of the visualized skull or soft tissues. No acute intracranial abnormality. Chronic bilateral maxillary sinusitis. XR CHEST PORTABLE    Result Date: 11/13/2022  EXAMINATION: ONE XRAY VIEW OF THE CHEST 11/13/2022 11:49 am COMPARISON: None. HISTORY: ORDERING SYSTEM PROVIDED HISTORY: seizures TECHNOLOGIST PROVIDED HISTORY: Reason for exam:->seizures Reason for Exam: seizures FINDINGS: Single view provided. Mild rotation. The mediastinal, cardiac, and hilar silhouettes are normal.  Normal lung volumes. No interstitial edema or pulmonary mass. No acute consolidation or effusion. No pneumothorax or free subdiaphragmatic air. The visualized bowel gas pattern is normal.  Bilateral chromic clavicular arthropathy. No acute osseous abnormality. No acute abnormality.        Personally reviewed Lab Studies, Imaging, and discussed case with Dr. Sara Flores    Electronically signed by ROSEMARY Soria CNP on 11/13/2022 at 3:11 PM

## 2022-11-13 NOTE — ED NOTES
ED TO INPATIENT SBAR HANDOFF    Patient Name: Elvia Barragan   :    57 y.o. MRN:  [id number]  Preferred Name  [place]  ED Room #:  ED20/ED-20  Family/Caregiver Present yes   Restraints no   Sitter no   Sepsis Risk Score Sepsis Risk Score: 1.49    Situation  Code Status: No Order No additional code details. Allergies: Risperdal [risperidone]  Weight: Patient Vitals for the past 96 hrs (Last 3 readings):   Weight   22 1138 162 lb (73.5 kg)     Arrived from: home  Chief Complaint:   Chief Complaint   Patient presents with    Seizures     Pt post ictal, no seizures witnessed by EMS. Glucose 180, no known fall related to seizure. Positive hx of seizures but is not medicated currently. Hospital Problem/Diagnosis:  Active Problems:    * No active hospital problems. *  Resolved Problems:    * No resolved hospital problems. *    Imaging:   CT HEAD WO CONTRAST (Select for new onset seizures or head trauma)   Final Result   No acute intracranial abnormality. Chronic bilateral maxillary sinusitis. XR CHEST PORTABLE   Final Result   No acute abnormality.            Abnormal labs:   Abnormal Labs Reviewed   CBC WITH AUTO DIFFERENTIAL - Abnormal; Notable for the following components:       Result Value    WBC 13.8 (*)     Hematocrit 40.5 (*)     Monocytes % 7.7 (*)     Immature Neutrophil % 0.9 (*)     All other components within normal limits   COMPREHENSIVE METABOLIC PANEL - Abnormal; Notable for the following components:    Sodium 133 (*)     Chloride 93 (*)     CO2 17 (*)     Creatinine 1.4 (*)     Est, Glom Filt Rate 56 (*)     Glucose 158 (*)     Anion Gap 23 (*)     All other components within normal limits   MAGNESIUM - Abnormal; Notable for the following components:    Magnesium 1.6 (*)     All other components within normal limits   LACTIC ACID - Abnormal; Notable for the following components:    Lactate 7.8 (*)     All other components within normal limits     Critical values: yes Abnormal Assessment Findings: seizure    Background  History:   Past Medical History:   Diagnosis Date    Bipolar 1 disorder ([facility] [address]     Hyperlipidemia     Hypertension     Seizures ([facility] [address]        Assessment    Vitals/MEWS: MEWS Score: 3  Level of Consciousness: Alert (0)   Vitals:    11/13/22 1142 11/13/22 1202 11/13/22 1247 11/13/22 1432   BP: (!) 173/99 (!) 157/96 (!) 180/101 (!) 156/93   Pulse: (!) 102 94 88 88   Resp: 24 28 25 16   Temp:       TempSrc:       SpO2: 97%  97% 93%   Weight:       Height:         FiO2 (%): room air        O2 Flow Rate: O2 Device: None (Room air)    Cardiac Rhythm:    Pain Assessment: verbal [x] Verbal [] Bridgett Guild Scale  Pain Scale: Pain Assessment  Pain Assessment: None - Denies Pain  Last documented pain score (0-10 scale)    Last documented pain medication administered: none  Mental Status: oriented  NIH Score:    C-SSRS:    Bedside swallow:    Ginny Coma Scale (GCS): [place] Coma Scale  Eye Opening: Spontaneous  Best Verbal Response: Oriented  Best Motor Response: Obeys commands  Ginny Coma Scale Score: 15  Active LDA's:   Peripheral IV 11/13/22 Left Antecubital (Active)   Site Assessment Clean, dry & intact 11/13/22 1144   Line Status Blood return noted 11/13/22 1144   Phlebitis Assessment No symptoms 11/13/22 1144   Infiltration Assessment 0 11/13/22 1144   Alcohol Cap Used No 11/13/22 1144   Dressing Status New dressing applied 11/13/22 1144   Dressing Type Transparent 11/13/22 1144     PO Status: Regular  Pertinent or High Risk Medications/Drips: yes   If Yes, please provide details: mag  Pending Blood Product Administration: no     You may also review the ED PT Care Timeline found under the Summary Nursing Index tab. Recommendation    Pending orders urine  Plan for Discharge (if known): Additional Comments: Pt arrived by ems due to a witnessed seizure. Pt is A&Ox4 and walks independently. Pt follows a neurologist but is not on any seizure medication.  Mag running at this time. Received Keppra in ED. Long time GF at bedside.     If any further questions, please call Sending RN at [phone]    Electronically signed by: Electronically signed by Gallito Uriarte RN on 11/13/2022 at 3:01 PM       Gallito Uriarte RN  11/13/22 6259

## 2022-11-14 ENCOUNTER — APPOINTMENT (OUTPATIENT)
Dept: ULTRASOUND IMAGING | Age: 62
End: 2022-11-14
Payer: COMMERCIAL

## 2022-11-14 ENCOUNTER — APPOINTMENT (OUTPATIENT)
Dept: MRI IMAGING | Age: 62
End: 2022-11-14
Payer: COMMERCIAL

## 2022-11-14 LAB
ALBUMIN SERPL-MCNC: 4.2 GM/DL (ref 3.4–5)
ALP BLD-CCNC: 121 IU/L (ref 40–128)
ALT SERPL-CCNC: 22 U/L (ref 10–40)
ANION GAP SERPL CALCULATED.3IONS-SCNC: 15 MMOL/L (ref 4–16)
AST SERPL-CCNC: 36 IU/L (ref 15–37)
BASOPHILS ABSOLUTE: 0.1 K/CU MM
BASOPHILS RELATIVE PERCENT: 0.5 % (ref 0–1)
BILIRUB SERPL-MCNC: 0.6 MG/DL (ref 0–1)
BUN BLDV-MCNC: 9 MG/DL (ref 6–23)
CALCIUM SERPL-MCNC: 9.1 MG/DL (ref 8.3–10.6)
CHLORIDE BLD-SCNC: 98 MMOL/L (ref 99–110)
CO2: 22 MMOL/L (ref 21–32)
CREAT SERPL-MCNC: 1.4 MG/DL (ref 0.9–1.3)
DIFFERENTIAL TYPE: ABNORMAL
EOSINOPHILS ABSOLUTE: 0.1 K/CU MM
EOSINOPHILS RELATIVE PERCENT: 1.1 % (ref 0–3)
GFR SERPL CREATININE-BSD FRML MDRD: 56 ML/MIN/1.73M2
GLUCOSE BLD-MCNC: 84 MG/DL (ref 70–99)
HCT VFR BLD CALC: 37 % (ref 42–52)
HEMOGLOBIN: 12.3 GM/DL (ref 13.5–18)
IMMATURE NEUTROPHIL %: 0.7 % (ref 0–0.43)
LACTATE: 1.1 MMOL/L (ref 0.4–2)
LYMPHOCYTES ABSOLUTE: 2.4 K/CU MM
LYMPHOCYTES RELATIVE PERCENT: 24.7 % (ref 24–44)
MCH RBC QN AUTO: 29.4 PG (ref 27–31)
MCHC RBC AUTO-ENTMCNC: 33.2 % (ref 32–36)
MCV RBC AUTO: 88.3 FL (ref 78–100)
MONOCYTES ABSOLUTE: 0.8 K/CU MM
MONOCYTES RELATIVE PERCENT: 7.9 % (ref 0–4)
NUCLEATED RBC %: 0 %
PDW BLD-RTO: 13.8 % (ref 11.7–14.9)
PLATELET # BLD: 273 K/CU MM (ref 140–440)
PMV BLD AUTO: 8.4 FL (ref 7.5–11.1)
POTASSIUM SERPL-SCNC: 4.3 MMOL/L (ref 3.5–5.1)
RBC # BLD: 4.19 M/CU MM (ref 4.6–6.2)
SEGMENTED NEUTROPHILS ABSOLUTE COUNT: 6.4 K/CU MM
SEGMENTED NEUTROPHILS RELATIVE PERCENT: 65.1 % (ref 36–66)
SODIUM BLD-SCNC: 135 MMOL/L (ref 135–145)
TOTAL IMMATURE NEUTOROPHIL: 0.07 K/CU MM
TOTAL NUCLEATED RBC: 0 K/CU MM
TOTAL PROTEIN: 6.9 GM/DL (ref 6.4–8.2)
WBC # BLD: 9.8 K/CU MM (ref 4–10.5)

## 2022-11-14 PROCEDURE — G0378 HOSPITAL OBSERVATION PER HR: HCPCS

## 2022-11-14 PROCEDURE — 6370000000 HC RX 637 (ALT 250 FOR IP)

## 2022-11-14 PROCEDURE — 6370000000 HC RX 637 (ALT 250 FOR IP): Performed by: PHYSICIAN ASSISTANT

## 2022-11-14 PROCEDURE — A9577 INJ MULTIHANCE: HCPCS | Performed by: NURSE PRACTITIONER

## 2022-11-14 PROCEDURE — 6370000000 HC RX 637 (ALT 250 FOR IP): Performed by: NURSE PRACTITIONER

## 2022-11-14 PROCEDURE — 94640 AIRWAY INHALATION TREATMENT: CPT

## 2022-11-14 PROCEDURE — 70553 MRI BRAIN STEM W/O & W/DYE: CPT

## 2022-11-14 PROCEDURE — 96366 THER/PROPH/DIAG IV INF ADDON: CPT

## 2022-11-14 PROCEDURE — 36415 COLL VENOUS BLD VENIPUNCTURE: CPT

## 2022-11-14 PROCEDURE — 85025 COMPLETE CBC W/AUTO DIFF WBC: CPT

## 2022-11-14 PROCEDURE — 6360000004 HC RX CONTRAST MEDICATION: Performed by: NURSE PRACTITIONER

## 2022-11-14 PROCEDURE — 83605 ASSAY OF LACTIC ACID: CPT

## 2022-11-14 PROCEDURE — 80053 COMPREHEN METABOLIC PANEL: CPT

## 2022-11-14 PROCEDURE — 96372 THER/PROPH/DIAG INJ SC/IM: CPT

## 2022-11-14 PROCEDURE — 94761 N-INVAS EAR/PLS OXIMETRY MLT: CPT

## 2022-11-14 PROCEDURE — 99205 OFFICE O/P NEW HI 60 MIN: CPT | Performed by: STUDENT IN AN ORGANIZED HEALTH CARE EDUCATION/TRAINING PROGRAM

## 2022-11-14 PROCEDURE — 6360000002 HC RX W HCPCS

## 2022-11-14 PROCEDURE — 2580000003 HC RX 258

## 2022-11-14 PROCEDURE — 93880 EXTRACRANIAL BILAT STUDY: CPT

## 2022-11-14 PROCEDURE — 6370000000 HC RX 637 (ALT 250 FOR IP): Performed by: STUDENT IN AN ORGANIZED HEALTH CARE EDUCATION/TRAINING PROGRAM

## 2022-11-14 RX ORDER — PANTOPRAZOLE SODIUM 40 MG/1
40 TABLET, DELAYED RELEASE ORAL
Status: DISCONTINUED | OUTPATIENT
Start: 2022-11-14 | End: 2022-11-15 | Stop reason: HOSPADM

## 2022-11-14 RX ORDER — OMEPRAZOLE 20 MG/1
20 CAPSULE, DELAYED RELEASE ORAL EVERY MORNING
Status: DISCONTINUED | OUTPATIENT
Start: 2022-11-14 | End: 2022-11-14 | Stop reason: CLARIF

## 2022-11-14 RX ORDER — AMITRIPTYLINE HYDROCHLORIDE 50 MG/1
150 TABLET, FILM COATED ORAL NIGHTLY
Status: DISCONTINUED | OUTPATIENT
Start: 2022-11-14 | End: 2022-11-15

## 2022-11-14 RX ORDER — BUDESONIDE AND FORMOTEROL FUMARATE DIHYDRATE 160; 4.5 UG/1; UG/1
2 AEROSOL RESPIRATORY (INHALATION) 2 TIMES DAILY
Status: DISCONTINUED | OUTPATIENT
Start: 2022-11-14 | End: 2022-11-15 | Stop reason: HOSPADM

## 2022-11-14 RX ORDER — CARVEDILOL 6.25 MG/1
12.5 TABLET ORAL 2 TIMES DAILY
Status: DISCONTINUED | OUTPATIENT
Start: 2022-11-14 | End: 2022-11-15 | Stop reason: HOSPADM

## 2022-11-14 RX ORDER — CETIRIZINE HYDROCHLORIDE 10 MG/1
10 TABLET ORAL DAILY
Status: DISCONTINUED | OUTPATIENT
Start: 2022-11-14 | End: 2022-11-15 | Stop reason: HOSPADM

## 2022-11-14 RX ORDER — PAROXETINE 10 MG/1
10 TABLET, FILM COATED ORAL DAILY
Status: DISCONTINUED | OUTPATIENT
Start: 2022-11-14 | End: 2022-11-15 | Stop reason: HOSPADM

## 2022-11-14 RX ORDER — ASPIRIN 81 MG/1
81 TABLET, CHEWABLE ORAL DAILY
Status: DISCONTINUED | OUTPATIENT
Start: 2022-11-14 | End: 2022-11-15 | Stop reason: HOSPADM

## 2022-11-14 RX ORDER — PAROXETINE 10 MG/5ML
10 SUSPENSION ORAL EVERY MORNING
Status: DISCONTINUED | OUTPATIENT
Start: 2022-11-14 | End: 2022-11-14 | Stop reason: CLARIF

## 2022-11-14 RX ORDER — ALBUTEROL SULFATE 90 UG/1
2 AEROSOL, METERED RESPIRATORY (INHALATION) EVERY 4 HOURS PRN
Status: DISCONTINUED | OUTPATIENT
Start: 2022-11-14 | End: 2022-11-15 | Stop reason: HOSPADM

## 2022-11-14 RX ORDER — CLOPIDOGREL BISULFATE 75 MG/1
75 TABLET ORAL DAILY
Status: DISCONTINUED | OUTPATIENT
Start: 2022-11-14 | End: 2022-11-15 | Stop reason: HOSPADM

## 2022-11-14 RX ORDER — HYDROCHLOROTHIAZIDE 12.5 MG/1
12.5 TABLET ORAL DAILY
Status: DISCONTINUED | OUTPATIENT
Start: 2022-11-14 | End: 2022-11-15 | Stop reason: HOSPADM

## 2022-11-14 RX ORDER — ALPRAZOLAM 0.5 MG/1
1 TABLET ORAL DAILY
Status: DISCONTINUED | OUTPATIENT
Start: 2022-11-14 | End: 2022-11-15 | Stop reason: HOSPADM

## 2022-11-14 RX ORDER — ATORVASTATIN CALCIUM 40 MG/1
80 TABLET, FILM COATED ORAL NIGHTLY
Status: DISCONTINUED | OUTPATIENT
Start: 2022-11-14 | End: 2022-11-15 | Stop reason: HOSPADM

## 2022-11-14 RX ADMIN — CLOPIDOGREL BISULFATE 75 MG: 75 TABLET ORAL at 16:46

## 2022-11-14 RX ADMIN — GADOBENATE DIMEGLUMINE 10 ML: 529 INJECTION, SOLUTION INTRAVENOUS at 14:18

## 2022-11-14 RX ADMIN — SODIUM CHLORIDE, PRESERVATIVE FREE 10 ML: 5 INJECTION INTRAVENOUS at 09:38

## 2022-11-14 RX ADMIN — Medication 100 MG: at 09:38

## 2022-11-14 RX ADMIN — CARVEDILOL 12.5 MG: 6.25 TABLET, FILM COATED ORAL at 09:39

## 2022-11-14 RX ADMIN — PANTOPRAZOLE SODIUM 40 MG: 40 TABLET, DELAYED RELEASE ORAL at 09:38

## 2022-11-14 RX ADMIN — ASPIRIN 81 MG CHEWABLE TABLET 81 MG: 81 TABLET CHEWABLE at 15:45

## 2022-11-14 RX ADMIN — ALPRAZOLAM 1 MG: 0.5 TABLET ORAL at 09:39

## 2022-11-14 RX ADMIN — ENOXAPARIN SODIUM 40 MG: 100 INJECTION SUBCUTANEOUS at 09:37

## 2022-11-14 RX ADMIN — CARVEDILOL 12.5 MG: 6.25 TABLET, FILM COATED ORAL at 21:18

## 2022-11-14 RX ADMIN — PAROXETINE 10 MG: 10 TABLET, FILM COATED ORAL at 09:38

## 2022-11-14 RX ADMIN — HYDROCHLOROTHIAZIDE 12.5 MG: 12.5 TABLET ORAL at 09:38

## 2022-11-14 RX ADMIN — CETIRIZINE HYDROCHLORIDE 10 MG: 10 TABLET, FILM COATED ORAL at 09:38

## 2022-11-14 RX ADMIN — AMITRIPTYLINE HYDROCHLORIDE 150 MG: 50 TABLET, FILM COATED ORAL at 22:02

## 2022-11-14 RX ADMIN — SODIUM CHLORIDE 500 MG: 9 INJECTION, SOLUTION INTRAVENOUS at 12:50

## 2022-11-14 RX ADMIN — ATORVASTATIN CALCIUM 80 MG: 40 TABLET, FILM COATED ORAL at 21:18

## 2022-11-14 RX ADMIN — SODIUM CHLORIDE, PRESERVATIVE FREE 10 ML: 5 INJECTION INTRAVENOUS at 21:18

## 2022-11-14 RX ADMIN — BUDESONIDE AND FORMOTEROL FUMARATE DIHYDRATE 2 PUFF: 160; 4.5 AEROSOL RESPIRATORY (INHALATION) at 21:15

## 2022-11-14 NOTE — PROGRESS NOTES
Hospitalist Progress Note      Name:  Delta Roberts /Age/Sex: 1960  (57 y.o. male)   MRN & CSN:  [id number] & [id number] Admission Date/Time: 2022 11:32 AM   Location:  [address] [place] [address] PCP: Nessa Castro 112 Day: 2                                               Attending Physician Dr. Cristina García and Plan:   Delta Roberts is a 57 y.o.  male  who presents with Seizure [facility])    Seizure like activity- witnessed  --Likely breakthrough. Lactic acidosis and leukocytosis present initially, resolved on repeat labs. Patient had an outpatient EEG on 22 that was normal.  --Given 1500mg Keppra in ED and Keppra BID inpatient  --IVF  --Neurology consulted- Established patient with Dr. Justice Cooper  --Seizure precautions  --Neuro checks q 4 h  --Monitor on Tele  --MRI brain showing acute to subacute infarct or right temporal lobe, mild chronic microvascular ischemic changes, mucosal thickening of maxillary sinuses bilaterally     Acute to subacute CVA  -- MRI results as above  --Continue home ASA, statin  --Await neuro recommendations  -- may need PT/OT eval, although no functional deficits noted on exam    Hypomagnesia, improved  --repleted in ED  --Monitor     Mild Hyponatremia, resolved    CKD  --Creatinine 1.4, has been elevated the past several years  --Monitor BMP     Alcohol abuse  --Patient drinks 12 pack per month, and reports does not need to drink daily. --Does not appear to be in withdrawal  --monitor for withdrawal symptoms  --Thiamine supplementation  --cessation encouraged     Tobacco abuse   --cessation encouraged  --nicotine patch ordered     HTN  --continue coreg and HCTZ    COPD  -- Continue home inhalers     HLD   --continue statin    Bipolar disorder  --Continue paxil    Marijuana use  --Positive on UDS    Diet ADULT DIET;  Regular   DVT Prophylaxis [x] Lovenox, []  Heparin, [] SCDs, [] Ambulation   GI Prophylaxis [] PPI,  [] H2 Blocker,  [] Carafate,  [] Diet/Tube Feeds   Code Status Full Code   Disposition Patient requires continued admission due to neuro recs     -Patient assessment and plan discussed with supervising physician-  Overnight events:     Mattie Nugent is a 57 y.o.  male, who presented with Seizures (Pt post ictal, no seizures witnessed by EMS. Glucose 180, no known fall related to seizure. Positive hx of seizures but is not medicated currently.)      Patient was seen and evaluated at bedside by myself. No acute overnight events. Patient reports feeling much improved, at baseline. Objective:   No intake or output data in the 24 hours ending 11/14/22 1533   Vitals:   Vitals:    11/13/22 1545 11/13/22 2046 11/14/22 0936 11/14/22 1445   BP: (!) 156/94 131/82 (!) 140/87 106/65   Pulse: 97 91 83 71   Resp: 18  20    Temp: 97.3 °F (36.3 °C) 98.1 °F (36.7 °C) 98.2 °F (36.8 °C) 98.4 °F (36.9 °C)   TempSrc: Oral  Oral    SpO2: 94% 97% 94%    Weight:       Height:         Physical Exam: 11/14/22     General: NAD  Eyes: EOMI  ENT: neck supple  Cardiovascular: Regular rate  Respiratory: Clear to auscultation  Gastrointestinal: Soft, non tender  Genitourinary: no suprapubic tenderness  Musculoskeletal: No edema  Skin: warm, dry  Neuro: Alert. No tremors, nonfocal exam, no sensory changes, motor functions intact BL upper and lower extremities  Psych: Mood appropriate.      Medications:   Medications:    ALPRAZolam  1 mg Oral Daily    atorvastatin  80 mg Oral Nightly    carvedilol  12.5 mg Oral BID    cetirizine  10 mg Oral Daily    hydroCHLOROthiazide  12.5 mg Oral Daily    pantoprazole  40 mg Oral QAM AC    PARoxetine  10 mg Oral Daily    budesonide-formoterol  2 puff Inhalation BID    And    tiotropium  2 puff Inhalation Daily    aspirin  81 mg Oral Daily    sodium chloride flush  5-40 mL IntraVENous 2 times per day    enoxaparin  40 mg SubCUTAneous Daily    nicotine  1 patch TransDERmal Daily    levETIRAcetam  500 mg IntraVENous Q12H    thiamine  100 mg Oral Daily      Infusions:    sodium chloride       PRN Meds: albuterol sulfate HFA, 2 puff, Q4H PRN  sodium chloride flush, 5-40 mL, PRN  sodium chloride, , PRN  ondansetron, 4 mg, Q8H PRN   Or  ondansetron, 4 mg, Q6H PRN  polyethylene glycol, 17 g, Daily PRN  acetaminophen, 650 mg, Q6H PRN   Or  acetaminophen, 650 mg, Q6H PRN  LORazepam, 0.5 mg, Q5 Min PRN        LABS  CBC   Recent Labs     11/13/22 1155 11/14/22  0514   WBC 13.8* 9.8   HGB 13.7 12.3*   HCT 40.5* 37.0*    273      RENAL  Recent Labs     11/13/22 1155 11/14/22 0514   * 135   K 3.9 4.3   CL 93* 98*   CO2 17* 22   BUN 9 9   CREATININE 1.4* 1.4*     LFT'S  Recent Labs     11/13/22 1155 11/14/22 0514   AST 29 36   ALT 28 22   BILITOT 0.5 0.6   ALKPHOS 129 121     COAG  No results for input(s): INR in the last 72 hours. CARDIAC ENZYMES  No results for input(s): CKTOTAL, CKMB, CKMBINDEX, TROPONINI in the last 72 hours. Radiology this visit:  Reviewed. CT HEAD WO CONTRAST (Select for new onset seizures or head trauma)    Result Date: 11/13/2022  EXAMINATION: CT OF THE HEAD WITHOUT CONTRAST  11/13/2022 12:35 pm TECHNIQUE: CT of the head was performed without the administration of intravenous contrast. Automated exposure control, iterative reconstruction, and/or weight based adjustment of the mA/kV was utilized to reduce the radiation dose to as low as reasonably achievable. COMPARISON: 08/17/2022 HISTORY: ORDERING SYSTEM PROVIDED HISTORY: seizure TECHNOLOGIST PROVIDED HISTORY: Has a \"code stroke\" or \"stroke alert\" been called? ->No Reason for exam:->seizure Decision Support Exception - unselect if not a suspected or confirmed emergency medical condition->Emergency Medical Condition (MA) Reason for Exam: seizure FINDINGS: BRAIN/VENTRICLES: There is no acute intracranial hemorrhage, mass effect or midline shift. No abnormal extra-axial fluid collection. The gray-white differentiation is maintained without evidence of an acute infarct. There is no evidence of hydrocephalus. ORBITS: The visualized portion of the orbits demonstrate no acute abnormality. SINUSES: There is evidence of chronic bilateral maxillary sinusitis. SOFT TISSUES/SKULL:  No acute abnormality of the visualized skull or soft tissues. No acute intracranial abnormality. Chronic bilateral maxillary sinusitis. XR CHEST PORTABLE    Result Date: 11/13/2022  EXAMINATION: ONE XRAY VIEW OF THE CHEST 11/13/2022 11:49 am COMPARISON: None. HISTORY: ORDERING SYSTEM PROVIDED HISTORY: seizures TECHNOLOGIST PROVIDED HISTORY: Reason for exam:->seizures Reason for Exam: seizures FINDINGS: Single view provided. Mild rotation. The mediastinal, cardiac, and hilar silhouettes are normal.  Normal lung volumes. No interstitial edema or pulmonary mass. No acute consolidation or effusion. No pneumothorax or free subdiaphragmatic air. The visualized bowel gas pattern is normal.  Bilateral chromic clavicular arthropathy. No acute osseous abnormality. No acute abnormality. MRI BRAIN W WO CONTRAST    Result Date: 11/14/2022  EXAMINATION: MRI OF THE BRAIN WITHOUT AND WITH CONTRAST  11/14/2022 1:38 pm TECHNIQUE: Multiplanar multisequence MRI of the head/brain was performed without and with the administration of intravenous contrast. COMPARISON: None. HISTORY: ORDERING SYSTEM PROVIDED HISTORY: Seizure r/o structural lesion TECHNOLOGIST PROVIDED HISTORY: Reason for exam:->Seizure r/o structural lesion Reason for Exam: Seizure r/o structural lesion Initial evaluation. FINDINGS: INTRACRANIAL STRUCTURES/VENTRICLES:  There is question a small acute to subacute infarct involving the right posterior temporal lobe (DWI series 4, image 15). The hippocampal structures are symmetric. No abnormal T2 FLAIR signal within the medial temporal lobes. No mass effect or midline shift. No evidence of an acute intracranial hemorrhage.   Areas of T2 FLAIR hyperintensity are seen in the periventricular and subcortical white matter, which are nonspecific, but may represent chronic microvascular ischemic change. There is minimal global parenchymal volume loss. Otherwise, the ventricles and sulci are normal in size and configuration. The sellar/suprasellar regions appear unremarkable. The normal signal voids within the major intracranial vessels appear maintained. No abnormal focus of enhancement is seen within the brain. ORBITS: The visualized portion of the orbits demonstrate no acute abnormality. SINUSES: There is moderate mucosal thickening of the maxillary sinuses bilaterally. The mastoid air cells demonstrate no acute abnormality. BONES/SOFT TISSUES: The bone marrow signal intensity appears normal. The soft tissues demonstrate no acute abnormality. 1. Question of a small acute to subacute infarct involving the right posterior temporal lobe. 2. Otherwise, no acute intracranial abnormality. 3. Minimal global parenchymal volume loss with mild chronic microvascular ischemic changes. 4. There is moderate mucosal thickening with complete opacification of the maxillary sinuses bilaterally.        Ayan Rodriguez PA-C  Hospitalist  11/14/2022, 3:33 PM

## 2022-11-14 NOTE — PLAN OF CARE
Problem: Safety - Adult  Goal: Free from fall injury  11/14/2022 1104 by Kinza Luong RN  Outcome: Adequate for Discharge  11/14/2022 1104 by Kinza Luong RN  Outcome: Progressing     Problem: Skin/Tissue Integrity  Goal: Absence of new skin breakdown  Description: 1. Monitor for areas of redness and/or skin breakdown  2. Assess vascular access sites hourly  3. Every 4-6 hours minimum:  Change oxygen saturation probe site  4. Every 4-6 hours:  If on nasal continuous positive airway pressure, respiratory therapy assess nares and determine need for appliance change or resting period.   11/14/2022 1104 by Kinza Luong RN  Outcome: Adequate for Discharge  11/14/2022 1104 by Kinza Luong RN  Outcome: Progressing     Problem: Discharge Planning  Goal: Discharge to home or other facility with appropriate resources  11/14/2022 1104 by Kinza Luong RN  Outcome: Adequate for Discharge  11/14/2022 1104 by Kinza Luong RN  Outcome: Progressing     Problem: Neurosensory - Adult  Goal: Absence of seizures  11/14/2022 1104 by Kinza Luong RN  Outcome: Adequate for Discharge  11/14/2022 1104 by Kinza Luong RN  Outcome: Progressing

## 2022-11-14 NOTE — CONSULTS
Neurology Service Consult Note  [address]   Patient Name: Delta Roberts  : 1960        Subjective:   Reason for consult: Seizure  57 y.o. male with history of bipolar 1, COPD, HLD, HTN, seizure presenting to [place] [address] via EMS after witnessed seizure  at home described by wife as stiffening of the body with \"foaming of the mouth and eyes rolling back in head\". He did not lose control of bowel or bladder and did not bite tongue. He was reported to be confused after the event. Patient was evaluated by Dr. Justice Cooper 22 for seizure. At that visit patient described events that started at the age of 45 with babbling and laughing preceding convulsions. He reported no history of incontinence or tongue biting but may experience confusion afterward. EEG was completed outpatient with no evidence for seizure or epileptiform discharges. No AED was initiated during this visit. Plan was for follow up in January. Following event  patient received 1500 mg Keppra in ED with Keppra 500 mg bid scheduled there after. Initial lab work noted elevated lactic 7.8 and WBC 13.8 that quickly nearly normalized 3.8 and 13.8. During exam patient is cooperative but does have underlying confusion and some disorganization of thinking (baseline per girlfriend at bedside). He has difficulty with understanding passage of time, for example hospitalization several months ago for seizure and current hospitalization. Overall he is a poor historian with much information provided by his girlfriend and sister at bedside. Attending Addendum:  Patient with chronic Xanax prescription. He tells me that he takes this nearly daily. Girlfriend states that she organizes all of his medications for him, however, the Xanax she places in his nightstand for him to control how frequent he needs to take it.   He is not able to give me nearly any detail regarding how often he takes it or even when he last took it but is rather adamant that he is taking it. Upon presentation his Drug screen was negative for benzodiazepines.         Past Medical History:   Diagnosis Date    Bipolar 1 disorder (HCC)     COPD (chronic obstructive pulmonary disease) (HCC)     Hyperlipidemia     Hypertension     Seizures (HCC)     :   Past Surgical History:   Procedure Laterality Date    KIDNEY STONE REMOVAL       Medications:  Scheduled Meds:   ALPRAZolam  1 mg Oral Daily    atorvastatin  80 mg Oral Nightly    carvedilol  12.5 mg Oral BID    cetirizine  10 mg Oral Daily    hydroCHLOROthiazide  1 capsule Oral Daily    omeprazole  20 mg Oral QAM    PARoxetine  10 mg Oral QAM    fluticasone-umeclidin-vilant  1 puff Inhalation Daily    sodium chloride flush  5-40 mL IntraVENous 2 times per day    enoxaparin  40 mg SubCUTAneous Daily    nicotine  1 patch TransDERmal Daily    levETIRAcetam  500 mg IntraVENous Q12H    thiamine  100 mg Oral Daily     Continuous Infusions:   sodium chloride       PRN Meds:.albuterol sulfate HFA, sodium chloride flush, sodium chloride, ondansetron **OR** ondansetron, polyethylene glycol, acetaminophen **OR** acetaminophen, LORazepam    Allergies   Allergen Reactions    Risperdal [Risperidone]      Social History     Socioeconomic History    Marital status: Single     Spouse name: Not on file    Number of children: 3    Years of education: Not on file    Highest education level: Not on file   Occupational History    Not on file   Tobacco Use    Smoking status: Every Day     Packs/day: 0.25     Years: 47.00     Pack years: 11.75     Types: Cigarettes     Start date: 1975    Smokeless tobacco: Never   Vaping Use    Vaping Use: Never used   Substance and Sexual Activity    Alcohol use: No    Drug use: Yes     Frequency: 1.0 times per week     Types: Marijuana [place])    Sexual activity: Not on file   Other Topics Concern    Not on file   Social History Narrative    Not on file     Social Determinants of Health Financial Resource Strain: Low Risk     Difficulty of Paying Living Expenses: Not hard at all   Food Insecurity: No Food Insecurity    Worried About Running Out of Food in the Last Year: Never true    Ran Out of Food in the Last Year: Never true   Transportation Needs: No Transportation Needs    Lack of Transportation (Medical): No    Lack of Transportation (Non-Medical): No   Physical Activity: Inactive    Days of Exercise per Week: 0 days    Minutes of Exercise per Session: 0 min   Stress: Stress Concern Present    Feeling of Stress : Rather much   Social Connections: Moderately Isolated    Frequency of Communication with Friends and Family: More than three times a week    Frequency of Social Gatherings with Friends and Family: Twice a week    Attends Religion Services: Never    Active Member of Clubs or Organizations: No    Attends Club or Organization Meetings: Never    Marital Status: Living with partner   Intimate Partner Violence: Not At Risk    Fear of Current or Ex-Partner: No    Emotionally Abused: No    Physically Abused: No    Sexually Abused: No   Housing Stability: Low Risk     Unable to Pay for Housing in the Last Year: No    Number of [place] in the Last Year: 1    Unstable Housing in the Last Year: No      History reviewed. No pertinent family history.       ROS (10 systems)  In addition to that documented in the HPI above, the additional ROS was obtained:  Constitutional: Denies fevers or chills  Eyes: Denies vision changes  ENMT: Denies sore throat  CV: Denies chest pain  Resp: Denies SOB  GI: Denies vomiting or diarrhea  : Denies painful urination  MSK: Denies recent trauma  Skin: Denies new rashes  Neuro: Denies new numbness or tingling or weakness  Endocrine: Denies unexpected weight loss  Heme: Denies bleeding disorders    Physical Exam:       Wt Readings from Last 3 Encounters:   11/13/22 162 lb (73.5 kg)   09/08/22 154 lb 9.6 oz (70.1 kg)   08/17/22 158 lb (71.7 kg)     Temp Readings from Last 3 Encounters:   11/13/22 98.1 °F (36.7 °C)   08/17/22 98.4 °F (36.9 °C) (Oral)     BP Readings from Last 3 Encounters:   11/13/22 131/82   09/08/22 132/80   08/17/22 (!) 145/101     Pulse Readings from Last 3 Encounters:   11/13/22 91   09/08/22 73   08/17/22 96        Gen: A&O x 4, NAD, cooperative  HEENT: NC/AT, EOMI, PERRL, mmm,  neck supple, no meningeal signs; Heart: NSR  Lungs: Respirations even and unlabored  Ext: no edema, no calf tenderness b/l  Psych: normal mood and affect, confused at times  Skin: no rashes or lesions    NEUROLOGIC EXAM:    Mental Status: A&O to self, location, month and year, NAD, speech clear, language fluent, repetition and naming intact, follows commands appropriately    Cranial Nerve Exam:   CN II-XII: PERRL, VFF, no nystagmus, no gaze paresis, sensation V1-V3 intact b/l, muscles of facial expression symmetric; hearing intact to conversational tone, palate elevates symmetrically, shoulder elevation symmetric and tongue protrudes midline with movement side to side. Motor Exam:       Strength 5/5 UE's/LE's b/l  Tone and bulk normal   No pronator drift    Deep Tendon Reflexes: 2/4 biceps, triceps, brachioradialis, patellar, and achilles b/l; flexor plantar responses b/l    Sensation: Intact light touch/pinprick/vibration UE's/LE's b/l    Coordination/Cerebellum:       Tremors--none      Rapidly alternating movements: no dysdiadochokinesia b/l                Heel-to-Shin: no dysmetria b/l      Finger-to-Nose: no dysmetria b/l    Gait and stance:      Gait: deferred      LABS:     Recent Labs     11/13/22  1155 11/14/22  0514   WBC 13.8* 9.8   * 135   K 3.9 4.3   CL 93* 98*   CO2 17* 22   BUN 9 9   CREATININE 1.4* 1.4*   GLUCOSE 158* 84            IMAGING:    CT head w/o contrast:  Impression   No acute intracranial abnormality. Chronic bilateral maxillary sinusitis. MRI brain w/ wo contrast:  Impression   1.  Question of a small acute to subacute infarct involving the right   posterior temporal lobe. 2. Otherwise, no acute intracranial abnormality. 3. Minimal global parenchymal volume loss with mild chronic microvascular   ischemic changes. 4. There is moderate mucosal thickening with complete opacification of the   maxillary sinuses bilaterally. Routine EEG 9/27/22:  EEG Interpretation: This EEG was within normal limits for a patient of this age in the awake and drowsy states. No focal, lateralizing, or epileptiform features were seen during the recording. Carotid US:  Pending     All imaging has been personally reviewed  ASSESSMENT/PLAN:   58-year-old male presenting after seizure-like episode that was witnessed. Today patient is seen resting in bed. He is alert, oriented x4 but does have underlying confusion and difficulty with providing accurate history. Vision is intact. Speech is mildly slurred as patient is in dentulous, language is fluent. There is no focal or lateralizing weakness or sensory change on exam.  New onset seizure  Concerned that this could be provoked by benzodiazepine withdrawal given negative findings for benzodiazepine on urine drug screen. Patient is supposed to be taking medication daily and per girlfriend at bedside is the only medication that the patient manages on his own. He is unable to clearly remember or provide history on when he last took the medication, or if he is even been taking the medication regularly. As noted below MRI does show some possible evidence for acute stroke involving the right posterior temporal lobe. While this area is small, could serve as a nidus for seizure. EEG as above  Given this is the second hospitalization in less than 6 months for seizure we will continue Keppra 500 mg twice daily at this time  Follow-up: Outpatient with Dr. Castro , consider ambulatory EEG    2.   Acute Right temporal lobe stroke  MRI notes possible small acute to subacute infarct involving the right posterior temporal lobe  Continue Lipitor   Will start patient on DAPT for 21 days, continue aspirin monotherapy thereafter  Carotid ultrasound ordered  Follow-up: Outpatient for stroke follow-up with Dr. Zunilda Vo in 6 weeks; May consider repeat MRI    From neurology standpoint if carotid ultrasound is completed with no evidence for carotid stenosis, patient is stable for discharge with recommendations as above. > 70 minutes of time spent included chart review, obtaining history, patient examination, developing plan of care, and documentation. Patient was discussed with attending neurologist Dr. Herbert Ricardo. Thank you for allowing us to participate in the care of your patient. If there are any questions regarding evaluation please feel free to contact us. ROSEMARY Acevedo CNP, 11/14/2022     ------------------------------------    Attending Note:  I have rounded on this patient with CAMPOS Marie. I have reviewed the chart and we have discussed this case in detail. The patient was seen and examined by myself. Pertinent labs and imaging have been personally reviewed. Our findings and impressions were discussed with the patient. I concur with the Nurse Specialist's assessment and plan. Patient seen and evaluated at bedside this afternoon. His girlfriend was present for examination. She tells us that she manages all of his medications except his Xanax. He is unable to provide nearly any detail regarding when the last time he took it or how frequent he takes it is other than he does believe he is taking the medication. Upon presentation his UDS was negative for benzodiazepines. This does increase concerned that he could have had a withdrawal seizure. He did have multiple lab derangements that were concerning for seizure including a transient lactic acidosis and leukocytosis. He has had outpatient EEG which was not epileptiform. Would consider obtaining a 48-hour ambulatory EEG.   For now, I will continue patient on Keppra 500 mg twice daily given it is uncertain if this event was provoked or not. MRI brain did demonstrate a questionable subacute infarct in the right temporal lobe. Images were reviewed personally and I am not convinced of any ADC correlate. We may consider repeat imaging at 6 weeks. It is possible that a temporal lobe stroke could have provoked a seizure but otherwise he has no focal neurologic deficits that would correlate. Nonetheless, will have him initiate dual antiplatelet therapy for 21 days after which she can continue on aspirin alone. He will remain on home dose statin. We have a carotid ultrasound pending to complete his work-up. If his carotid ultrasound is unremarkable, then he would be okay for discharge from a neurologic standpoint with outpatient follow-up with Dr. Ed Prather or our [facility].     Romayne Dow, DO 8/13/2022 2:08 PM

## 2022-11-15 VITALS
DIASTOLIC BLOOD PRESSURE: 71 MMHG | WEIGHT: 162 LBS | TEMPERATURE: 97.9 F | OXYGEN SATURATION: 95 % | BODY MASS INDEX: 26.03 KG/M2 | RESPIRATION RATE: 16 BRPM | HEART RATE: 83 BPM | SYSTOLIC BLOOD PRESSURE: 109 MMHG | HEIGHT: 66 IN

## 2022-11-15 PROCEDURE — 6370000000 HC RX 637 (ALT 250 FOR IP): Performed by: STUDENT IN AN ORGANIZED HEALTH CARE EDUCATION/TRAINING PROGRAM

## 2022-11-15 PROCEDURE — 6370000000 HC RX 637 (ALT 250 FOR IP): Performed by: PHYSICIAN ASSISTANT

## 2022-11-15 PROCEDURE — 6370000000 HC RX 637 (ALT 250 FOR IP)

## 2022-11-15 PROCEDURE — 94640 AIRWAY INHALATION TREATMENT: CPT

## 2022-11-15 PROCEDURE — 6360000002 HC RX W HCPCS

## 2022-11-15 PROCEDURE — 94761 N-INVAS EAR/PLS OXIMETRY MLT: CPT

## 2022-11-15 PROCEDURE — G0378 HOSPITAL OBSERVATION PER HR: HCPCS

## 2022-11-15 PROCEDURE — 6370000000 HC RX 637 (ALT 250 FOR IP): Performed by: NURSE PRACTITIONER

## 2022-11-15 PROCEDURE — 96366 THER/PROPH/DIAG IV INF ADDON: CPT

## 2022-11-15 PROCEDURE — 2580000003 HC RX 258

## 2022-11-15 RX ORDER — LEVETIRACETAM 500 MG/1
500 TABLET ORAL 2 TIMES DAILY
Qty: 60 TABLET | Refills: 3 | Status: CANCELLED | OUTPATIENT
Start: 2022-11-15

## 2022-11-15 RX ORDER — ALPRAZOLAM 0.5 MG/1
0.5 TABLET ORAL 2 TIMES DAILY
Qty: 14 TABLET | Refills: 0 | Status: SHIPPED | OUTPATIENT
Start: 2022-11-15 | End: 2022-11-22

## 2022-11-15 RX ORDER — CLOPIDOGREL BISULFATE 75 MG/1
75 TABLET ORAL DAILY
Qty: 21 TABLET | Refills: 0 | Status: SHIPPED | OUTPATIENT
Start: 2022-11-15 | End: 2022-12-06

## 2022-11-15 RX ORDER — LEVETIRACETAM 500 MG/1
500 TABLET ORAL 2 TIMES DAILY
Status: DISCONTINUED | OUTPATIENT
Start: 2022-11-15 | End: 2022-11-15 | Stop reason: HOSPADM

## 2022-11-15 RX ORDER — LEVETIRACETAM 500 MG/1
500 TABLET ORAL 2 TIMES DAILY
Qty: 60 TABLET | Refills: 3 | Status: SHIPPED | OUTPATIENT
Start: 2022-11-15

## 2022-11-15 RX ORDER — ASPIRIN 81 MG/1
81 TABLET, CHEWABLE ORAL DAILY
Qty: 30 TABLET | Refills: 3 | Status: SHIPPED | OUTPATIENT
Start: 2022-11-15

## 2022-11-15 RX ADMIN — TIOTROPIUM BROMIDE INHALATION SPRAY 2 PUFF: 3.12 SPRAY, METERED RESPIRATORY (INHALATION) at 08:13

## 2022-11-15 RX ADMIN — ASPIRIN 81 MG CHEWABLE TABLET 81 MG: 81 TABLET CHEWABLE at 09:44

## 2022-11-15 RX ADMIN — ALPRAZOLAM 1 MG: 0.5 TABLET ORAL at 09:44

## 2022-11-15 RX ADMIN — CETIRIZINE HYDROCHLORIDE 10 MG: 10 TABLET, FILM COATED ORAL at 09:44

## 2022-11-15 RX ADMIN — LEVETIRACETAM 500 MG: 500 TABLET, FILM COATED ORAL at 09:44

## 2022-11-15 RX ADMIN — BUDESONIDE AND FORMOTEROL FUMARATE DIHYDRATE 2 PUFF: 160; 4.5 AEROSOL RESPIRATORY (INHALATION) at 08:13

## 2022-11-15 RX ADMIN — PAROXETINE 10 MG: 10 TABLET, FILM COATED ORAL at 09:44

## 2022-11-15 RX ADMIN — Medication 100 MG: at 09:43

## 2022-11-15 RX ADMIN — HYDROCHLOROTHIAZIDE 12.5 MG: 12.5 TABLET ORAL at 09:44

## 2022-11-15 RX ADMIN — CARVEDILOL 12.5 MG: 6.25 TABLET, FILM COATED ORAL at 09:44

## 2022-11-15 RX ADMIN — SODIUM CHLORIDE 500 MG: 9 INJECTION, SOLUTION INTRAVENOUS at 00:31

## 2022-11-15 RX ADMIN — PANTOPRAZOLE SODIUM 40 MG: 40 TABLET, DELAYED RELEASE ORAL at 05:42

## 2022-11-15 RX ADMIN — SODIUM CHLORIDE, PRESERVATIVE FREE 10 ML: 5 INJECTION INTRAVENOUS at 09:43

## 2022-11-15 RX ADMIN — CLOPIDOGREL BISULFATE 75 MG: 75 TABLET ORAL at 09:44

## 2022-11-15 NOTE — DISCHARGE SUMMARY
V2.0  Discharge Summary    Name:  Gee Bonilla /Age/Sex: 1960 (53 y.o. male)   Admit Date: 2022  Discharge Date: 11/15/22    MRN & CSN:  [id number] & [id number] Encounter Date and Time 11/15/22 10:56 AM EST    Attending:  No att. providers found Discharging Provider: Dorene Chavez [facility] Course:     Brief HPI: Gee Bonilla is a 57 y.o. male with a pmh of Bipolar 1 disorder, HLD, HTN, seizure who presents with Seizure ([facility] [address]    Problems addressed during this hospitalization:     Seizure like activity- witnessed  --Likely breakthrough. Lactic acidosis and leukocytosis present initially, resolved on repeat labs. Patient had an outpatient EEG on 22 that was normal.  - MRI brain with questionable small acute to subacute infarct involving the right posterior temporal lobe, otherwise nonacute abnormality  --Given 1500mg Keppra in ED and Keppra BID inpatient  --Neurology consulted-concern for possible benzo withdrawal versus temporal CVA as nidus for seizure, started on Keppra 500mg BID with instructions to follow-up in 6 weeks as outpatient     Possible CVA  -MRI brain with questionable small acute to subacute infarct involving the right posterior temporal lobe  -Neurology consulted, recommended DAPT x21 days, then aspirin indefinitely, continue home statin, follow-up with neurology in 6 weeks    Hypomagnesia, improved  --repleted in ED  --Monitor     Mild Hyponatremia, resolved     CKD  --Creatinine 1.4, has been elevated the past several years  --Monitor BMP     Alcohol abuse  --Patient drinks 12 pack per month, and reports does not need to drink daily.    --Does not appear to be in withdrawal  --cessation encouraged     Tobacco abuse   --cessation encouraged  --nicotine patch ordered     HTN  --continue coreg and HCTZ     COPD  -- Continue home inhalers     HLD   --continue statin     Bipolar disorder  --Continue paxil     Marijuana use  --Positive on UDS    Anxiety  -On Xanax 0.5 mg twice daily per PCP, last fill date 10/31/2022  -UDS negative for benzos, ?benzo withdrawal causing seizure  -Significant other at bedside states the patient is out of this prescription  -Short course of Xanax 0.5 mg twice daily prescribed on discharge, instructed patient and family to follow-up as soon as possible with PCP for refills and to discuss weaning this medication, patient and family agreeable  -Stop Elavil in setting of seizures    This patient was discussed in conjunction with Dr. Theresa Sun. He was agreeable with patient's plan at discharge as dictated above. The patient expressed appropriate understanding of, and agreement with the discharge recommendations, medications, and plan. Consults this admission:  IP CONSULT TO NEUROLOGY    Discharge Diagnosis:   Seizure [facility])    Discharge Instruction:   Follow up appointments: PCP,neurology  Primary care physician: ROSEMARY Rivera CNP within 2 weeks  Diet: regular diet   Activity: activity as tolerated  Disposition: Discharged to:   [x]Home, [][facility], []SNF, []Acute Rehab, []Hospice   Condition on discharge: Stable  Labs and Tests to be Followed up as an outpatient by PCP or Specialist:     Discharge Medications:        Medication List        START taking these medications      aspirin 81 MG chewable tablet  Take 1 tablet by mouth daily     clopidogrel 75 MG tablet  Commonly known as: PLAVIX  Take 1 tablet by mouth daily for 21 doses     levETIRAcetam 500 MG tablet  Commonly known as: KEPPRA  Take 1 tablet by mouth 2 times daily            CHANGE how you take these medications      ALPRAZolam 0.5 MG tablet  Commonly known as: XANAX  Take 1 tablet by mouth in the morning and at bedtime for 7 days. What changed:   medication strength  how much to take  when to take this  Another medication with the same name was removed. Continue taking this medication, and follow the directions you see here.             CONTINUE taking these medications atorvastatin 80 MG tablet  Commonly known as: LIPITOR     carvedilol 12.5 MG tablet  Commonly known as: COREG     HM Loratadine 10 MG tablet  Generic drug: loratadine     hydroCHLOROthiazide 12.5 MG capsule  Commonly known as: MICROZIDE     Magnesium Oxide 200 MG Tabs  Commonly known as: Mag-Oxide  Take 1 tablet by mouth daily for 5 days     omega-3 acid ethyl esters 1 g capsule  Commonly known as: LOVAZA     omeprazole 20 MG delayed release capsule  Commonly known as: PRILOSEC     PARoxetine 10 MG/5ML suspension  Commonly known as: PAXIL     polyethylene glycol 17 GM/SCOOP powder  Commonly known as: GLYCOLAX     Trelegy Ellipta 100-62.5-25 MCG/INH Aepb  Generic drug: fluticasone-umeclidin-vilant     Ventolin  (90 Base) MCG/ACT inhaler  Generic drug: albuterol sulfate HFA     vitamin D 1.25 MG (206[address] UT) Caps capsule  Commonly known as: ERGOCALCIFEROL            STOP taking these medications      amitriptyline 10 MG tablet  Commonly known as: ELAVIL     amitriptyline 100 MG tablet  Commonly known as: ELAVIL     amitriptyline 150 MG tablet  Commonly known as: ELAVIL     bisacodyl 5 MG EC tablet  Commonly known as: DULCOLAX     triamcinolone 0.1 % ointment  Commonly known as: KENALOG               Where to Get Your Medications        These medications were sent to [address] [place] [address], [address]      Phone: [phone]   ALPRAZolam 0.5 MG tablet  aspirin 81 MG chewable tablet  clopidogrel 75 MG tablet  levETIRAcetam 500 MG tablet        Objective Findings at Discharge:   /71   Pulse 83   Temp 97.9 °F (36.6 °C) (Oral)   Resp 16   Ht 5' 6\" (1.676 m)   Wt 162 lb (73.5 kg)   SpO2 95%   BMI 26.15 kg/m²       Physical Exam:   General: NAD  Eyes: EOMI  ENT: neck supple  Cardiovascular: Regular rate and rhythm.   Respiratory: Clear to auscultation bilaterally  Gastrointestinal: Abdomen soft, non tender  Genitourinary: no suprapubic tenderness  Musculoskeletal: No edema  Skin: warm, dry  Neuro: Alert and oriented, hard of hearing, no focal deficits  Psych: Mood appropriate. Labs and Imaging   CT HEAD WO CONTRAST ([facility] for new onset seizures or head trauma)    Result Date: 11/13/2022  EXAMINATION: CT OF THE HEAD WITHOUT CONTRAST  11/13/2022 12:35 pm TECHNIQUE: CT of the head was performed without the administration of intravenous contrast. Automated exposure control, iterative reconstruction, and/or weight based adjustment of the mA/kV was utilized to reduce the radiation dose to as low as reasonably achievable. COMPARISON: 08/17/2022 HISTORY: ORDERING SYSTEM PROVIDED HISTORY: seizure TECHNOLOGIST PROVIDED HISTORY: Has a \"code stroke\" or \"stroke alert\" been called? ->No Reason for exam:->seizure Decision Support Exception - unselect if not a suspected or confirmed emergency medical condition->Emergency Medical Condition (MA) Reason for Exam: seizure FINDINGS: BRAIN/VENTRICLES: There is no acute intracranial hemorrhage, mass effect or midline shift. No abnormal extra-axial fluid collection. The gray-white differentiation is maintained without evidence of an acute infarct. There is no evidence of hydrocephalus. ORBITS: The visualized portion of the orbits demonstrate no acute abnormality. SINUSES: There is evidence of chronic bilateral maxillary sinusitis. SOFT TISSUES/SKULL:  No acute abnormality of the visualized skull or soft tissues. No acute intracranial abnormality. Chronic bilateral maxillary sinusitis. XR CHEST PORTABLE    Result Date: 11/13/2022  EXAMINATION: ONE XRAY VIEW OF THE CHEST 11/13/2022 11:49 am COMPARISON: None. HISTORY: ORDERING SYSTEM PROVIDED HISTORY: seizures TECHNOLOGIST PROVIDED HISTORY: Reason for exam:->seizures Reason for Exam: seizures FINDINGS: Single view provided. Mild rotation.   The mediastinal, cardiac, and hilar silhouettes are normal. Normal lung volumes. No interstitial edema or pulmonary mass. No acute consolidation or effusion. No pneumothorax or free subdiaphragmatic air. The visualized bowel gas pattern is normal.  Bilateral chromic clavicular arthropathy. No acute osseous abnormality. No acute abnormality. VL DUP CAROTID BILATERAL    Result Date: 11/14/2022  EXAMINATION: ULTRASOUND EVALUATION OF THE CAROTID ARTERIES 11/14/2022 TECHNIQUE: Duplex ultrasound using B-mode/gray scaled imaging, Doppler spectral analysis and color flow Doppler was obtained of the carotid arteries. COMPARISON: None. HISTORY: ORDERING SYSTEM PROVIDED HISTORY: stroke TECHNOLOGIST PROVIDED HISTORY: Reason for exam:->stroke FINDINGS: RIGHT: The right common carotid artery demonstrates peak systolic velocities of 85 and 73 cm/sec in the proximal and distal segments respectively. The right common carotid artery demonstrates end-diastolic velocities of 17 and 13 cm/sec in the proximal and distal segments respectively. The right internal carotid artery demonstrates the systolic velocities of 82, 60, and 62 cm/sec in the proximal, mid and distal segments respectively. The right internal carotid artery demonstrates the end-diastolic velocities of 19, 22, and 18 cm/sec in the proximal, mid and distal segments respectively. The external carotid artery appears patent. The vertebral artery demonstrates normal antegrade flow. There is a mild amount plaque in the distal common and proximal internal carotid arteries. ICA/CCA ratio of 1.0 LEFT: The left common carotid artery demonstrates peak systolic velocities of 99 and 73 cm/sec in the proximal and distal segments respectively. The left common carotid artery demonstrates end-diastolic velocities of 23 and 21 cm/sec in the proximal and distal segments respectively. The left internal carotid artery demonstrates the systolic velocities of 94, 73, and 72 cm/sec in the proximal, mid and distal segments respectively. The left internal carotid artery demonstrates the end-diastolic velocities of 25, 21, and 26 cm/sec in the proximal, mid and distal segments respectively. The external carotid artery appears patent. The vertebral artery demonstrates normal antegrade flow. There is mild plaque in the distal common and proximal internal carotid arteries ICA/CCA ratio of 0.9     The right internal carotid artery demonstrates 0-50% stenosis. The left internal carotid artery demonstrates 0-50% stenosis. Bilateral vertebral arteries are patent with flow in the normal direction. MRI BRAIN W WO CONTRAST    Result Date: 11/14/2022  EXAMINATION: MRI OF THE BRAIN WITHOUT AND WITH CONTRAST  11/14/2022 1:38 pm TECHNIQUE: Multiplanar multisequence MRI of the head/brain was performed without and with the administration of intravenous contrast. COMPARISON: None. HISTORY: ORDERING SYSTEM PROVIDED HISTORY: Seizure r/o structural lesion TECHNOLOGIST PROVIDED HISTORY: Reason for exam:->Seizure r/o structural lesion Reason for Exam: Seizure r/o structural lesion Initial evaluation. FINDINGS: INTRACRANIAL STRUCTURES/VENTRICLES:  There is question a small acute to subacute infarct involving the right posterior temporal lobe (DWI series 4, image 15). The hippocampal structures are symmetric. No abnormal T2 FLAIR signal within the medial temporal lobes. No mass effect or midline shift. No evidence of an acute intracranial hemorrhage. Areas of T2 FLAIR hyperintensity are seen in the periventricular and subcortical white matter, which are nonspecific, but may represent chronic microvascular ischemic change. There is minimal global parenchymal volume loss. Otherwise, the ventricles and sulci are normal in size and configuration. The sellar/suprasellar regions appear unremarkable. The normal signal voids within the major intracranial vessels appear maintained. No abnormal focus of enhancement is seen within the brain.  ORBITS: The visualized portion of the orbits demonstrate no acute abnormality. SINUSES: There is moderate mucosal thickening of the maxillary sinuses bilaterally. The mastoid air cells demonstrate no acute abnormality. BONES/SOFT TISSUES: The bone marrow signal intensity appears normal. The soft tissues demonstrate no acute abnormality. 1. Question of a small acute to subacute infarct involving the right posterior temporal lobe. 2. Otherwise, no acute intracranial abnormality. 3. Minimal global parenchymal volume loss with mild chronic microvascular ischemic changes. 4. There is moderate mucosal thickening with complete opacification of the maxillary sinuses bilaterally. CBC:   Recent Labs     11/13/22 1155 11/14/22  0514   WBC 13.8* 9.8   HGB 13.7 12.3*    273     BMP:    Recent Labs     11/13/22 1155 11/14/22 0514   * 135   K 3.9 4.3   CL 93* 98*   CO2 17* 22   BUN 9 9   CREATININE 1.4* 1.4*   GLUCOSE 158* 84     Hepatic:   Recent Labs     11/13/22 1155 11/14/22 0514   AST 29 36   ALT 28 22   BILITOT 0.5 0.6   ALKPHOS 129 121     Lipids: No results found for: CHOL, HDL, TRIG  Hemoglobin A1C: No results found for: LABA1C  TSH: No results found for: TSH  Troponin:   Lab Results   Component Value Date/Time    TROPONINT <0.010 08/17/2022 02:50 PM     Lactic Acid: No results for input(s): LACTA in the last 72 hours. BNP: No results for input(s): PROBNP in the last 72 hours.   UA:  Lab Results   Component Value Date/Time    NITRU NEGATIVE 11/13/2022 04:33 PM    COLORU YELLOW 11/13/2022 04:33 PM    WBCUA <1 11/13/2022 04:33 PM    RBCUA 2 11/13/2022 04:33 PM    MUCUS RARE 11/13/2022 04:33 PM    TRICHOMONAS NONE SEEN 11/13/2022 04:33 PM    BACTERIA NEGATIVE 11/13/2022 04:33 PM    CLARITYU CLEAR 11/13/2022 04:33 PM    SPECGRAV 1.020 11/13/2022 04:33 PM    LEUKOCYTESUR NEGATIVE 11/13/2022 04:33 PM    UROBILINOGEN 0.2 11/13/2022 04:33 PM    BILIRUBINUR NEGATIVE 11/13/2022 04:33 PM    BLOODU SMALL NUMBER OR AMOUNT OBSERVED 11/13/2022 04:33 PM    KETUA NEGATIVE 11/13/2022 04:33 PM     Urine Cultures: No results found for: LABURIN  Blood Cultures: No results found for: BC  No results found for: BLOODCULT2  Organism: No results found for: ORG    Time Spent Discharging patient 35 minutes    Electronically signed by Lokesh Ferreira PA-C on 11/15/2022 at 11:51 AM

## 2022-11-15 NOTE — PLAN OF CARE
Problem: Safety - Adult  Goal: Free from fall injury  11/15/2022 1121 by Frank Norwood RN  Outcome: Adequate for Discharge  11/15/2022 0259 by Elena Escamilla RN  Outcome: Progressing     Problem: Skin/Tissue Integrity  Goal: Absence of new skin breakdown  Description: 1. Monitor for areas of redness and/or skin breakdown  2. Assess vascular access sites hourly  3. Every 4-6 hours minimum:  Change oxygen saturation probe site  4. Every 4-6 hours:  If on nasal continuous positive airway pressure, respiratory therapy assess nares and determine need for appliance change or resting period.   11/15/2022 1121 by Frank Norwood RN  Outcome: Adequate for Discharge  11/15/2022 0259 by Elena Escamilla RN  Outcome: Progressing     Problem: Discharge Planning  Goal: Discharge to home or other facility with appropriate resources  11/15/2022 1121 by Frank Norwood RN  Outcome: Adequate for Discharge  11/15/2022 0259 by Elena Escamilla RN  Outcome: Progressing     Problem: Neurosensory - Adult  Goal: Absence of seizures  11/15/2022 1121 by Frank Norwood RN  Outcome: Adequate for Discharge  11/15/2022 0259 by Elena Escamilla RN  Outcome: Progressing

## 2022-11-15 NOTE — PROGRESS NOTES
Outpatient Pharmacy Progress Note for Meds-to-Beds    Total number of Prescriptions Filled: 4  The following medications were dispensed to the patient during the discharge process:  Alprazolam  Levetiracetam  Clopidogrel  Aspirin     Additional Documentation:  Patient picked-up the medication(s) in the OP Pharmacy      Thank you for letting us serve your patients.   [address] [facility]""    [address], [address] [facility]    Phone: [phone]    Fax: [phone]

## 2022-11-15 NOTE — PLAN OF CARE
Problem: Safety - Adult  Goal: Free from fall injury  Outcome: Progressing     Problem: Skin/Tissue Integrity  Goal: Absence of new skin breakdown  Description: 1. Monitor for areas of redness and/or skin breakdown  2. Assess vascular access sites hourly  3. Every 4-6 hours minimum:  Change oxygen saturation probe site  4. Every 4-6 hours:  If on nasal continuous positive airway pressure, respiratory therapy assess nares and determine need for appliance change or resting period.   Outcome: Progressing     Problem: Discharge Planning  Goal: Discharge to home or other facility with appropriate resources  Outcome: Progressing     Problem: Neurosensory - Adult  Goal: Absence of seizures  Outcome: Progressing

## 2023-01-05 ENCOUNTER — HOSPITAL ENCOUNTER (OUTPATIENT)
Dept: CT IMAGING | Age: 63
Discharge: HOME | End: 2023-01-05
Payer: COMMERCIAL

## 2023-01-05 DIAGNOSIS — R91.1 SOLITARY PULMONARY NODULE: ICD-10-CM

## 2023-01-05 PROCEDURE — 71250 CT THORAX DX C-: CPT

## 2023-01-24 ENCOUNTER — TELEPHONE (OUTPATIENT)
Dept: NEUROLOGY | Age: 63
End: 2023-01-24

## 2023-01-24 NOTE — TELEPHONE ENCOUNTER
Pt significant other Mague called to cancel pt f/u ov stating that pt has been sick. She proceeded to state that pt has not been feeling well since being taken off of medications that pt had been on for 20 years. After reviewing last ov advised that this is most likely related to concern with long-term use of those medications and risk for recurrent seizures. Advised to discuss with PCP and/or mental health specialist and Jesús Ordonez stated that pt doesn't feel mental health has been beneficial for him. Urged to then discuss with PCP to tx underlying mental health issues. She stated pt is to see PCP soon and declined to reschedule for neurology.

## 2023-03-10 RX ORDER — LEVETIRACETAM 500 MG/1
500 TABLET ORAL 2 TIMES DAILY
Qty: 60 TABLET | Refills: 3 | Status: SHIPPED | OUTPATIENT
Start: 2023-03-10

## 2023-03-10 NOTE — TELEPHONE ENCOUNTER
Patient's family called and he will be completely out before the 3/14 appointment.      Requested Prescriptions     Pending Prescriptions Disp Refills    levETIRAcetam (KEPPRA) 500 MG tablet 60 tablet 3     Sig: Take 1 tablet by mouth 2 times daily

## 2023-03-14 ENCOUNTER — OFFICE VISIT (OUTPATIENT)
Dept: NEUROLOGY | Age: 63
End: 2023-03-14
Payer: COMMERCIAL

## 2023-03-14 VITALS — HEIGHT: 66 IN | OXYGEN SATURATION: 95 % | HEART RATE: 50 BPM | BODY MASS INDEX: 26.03 KG/M2 | WEIGHT: 162 LBS

## 2023-03-14 DIAGNOSIS — R56.9 SEIZURES (HCC): Primary | ICD-10-CM

## 2023-03-14 DIAGNOSIS — I63.9 CEREBROVASCULAR ACCIDENT (CVA), UNSPECIFIED MECHANISM (HCC): ICD-10-CM

## 2023-03-14 DIAGNOSIS — I10 HYPERTENSION, UNSPECIFIED TYPE: ICD-10-CM

## 2023-03-14 DIAGNOSIS — F31.9 BIPOLAR 1 DISORDER (HCC): ICD-10-CM

## 2023-03-14 DIAGNOSIS — F41.9 ANXIETY: ICD-10-CM

## 2023-03-14 PROCEDURE — G8419 CALC BMI OUT NRM PARAM NOF/U: HCPCS | Performed by: NURSE PRACTITIONER

## 2023-03-14 PROCEDURE — 3017F COLORECTAL CA SCREEN DOC REV: CPT | Performed by: NURSE PRACTITIONER

## 2023-03-14 PROCEDURE — G8427 DOCREV CUR MEDS BY ELIG CLIN: HCPCS | Performed by: NURSE PRACTITIONER

## 2023-03-14 PROCEDURE — 4004F PT TOBACCO SCREEN RCVD TLK: CPT | Performed by: NURSE PRACTITIONER

## 2023-03-14 PROCEDURE — 99214 OFFICE O/P EST MOD 30 MIN: CPT | Performed by: NURSE PRACTITIONER

## 2023-03-14 PROCEDURE — G8484 FLU IMMUNIZE NO ADMIN: HCPCS | Performed by: NURSE PRACTITIONER

## 2023-03-14 RX ORDER — LEVETIRACETAM 500 MG/1
500 TABLET ORAL 2 TIMES DAILY
Qty: 60 TABLET | Refills: 3 | Status: SHIPPED | OUTPATIENT
Start: 2023-03-14

## 2023-03-14 NOTE — PROGRESS NOTES
Provider reported that due to the nature of visit, she felt she would be unable to continue the patients neurological care after today. She reported that the wife was very disruptive in the visit, aggressive and argumentative especially surround the request of xanax. She asked that I speak with them regarding this behavior. I introduced myself and explained that the provider felt that she was unable to provide care due to the behaviors of both the patient and his wife and that we would be unable to continue care. I also witnessed the wife very argumentative and attempting to intimidate. At that point, the patient became very emotional and apologetic. He offered to return alone if we would continue to see him. I was agreeable to this as long as the other provider Meryle Silva) was willing to see him. I asked that he check out at the  and contact the office later that day after I had time to discuss this with Zev Reyna. Araceli Dakin did seem much more reasonable and remorseful towards the end of the conversation. Zev Reyna is agreeable to see the patient but will not prescribe any controlled substances. If any future behavioral disturbances occur the patient will be dismissed from the practice.

## 2023-03-14 NOTE — PROGRESS NOTES
3/14/23    Luxor Stow  1960    Chief Complaint   Patient presents with    Seizures     Pt presents for seizure f/u, pt states no recent seizure activity, pt's wife states Nov was the last one        History of Present Illness  Shyanne Tan is a 57 y.o. male presenting today for follow-up of: Patient is being seen as a hospital follow-up visit for seizure, likely related to benzodiazepine withdrawal from prescribed chronic Xanax. He is accompanied by his wife. Patient was hospitalized 11/13/2022 for seizure-like activity described as stiffening of his body with foaming of the mouth and eyes rolling back in his head, no loss of bowel or bladder control, no tongue biting. Patient had been evaluated by Dr. Curtis Morrow 9/8/2022 for seizure reporting at that time having episodes starting at the age of 45 of babbling and laughing proceeding convulsions, no history of incontinence or tongue biting but did report confusion afterwards. EEG at that time was negative for evidence for seizure or epileptiform discharges, he was not initiated on AED at that time. Patient was initiated on Keppra 500 mg twice daily at hospital visit. Patient had MRI during hospitalization that showed possible evidence of acute stroke involving right posterior temporal lobe, thought to be possible nedus for seizure. Patient does have history of alcohol abuse, reported drinking 12 pack/month, current smoker and nicotine patch was ordered. Smoking and alcohol use cessation encouraged. Patient drug screen was positive for marijuana. Patient was to follow-up with PCP to discuss weaning off of Xanax, a 0.5 mg twice daily short course of Xanax was ordered upon discharge. He was to stop Elavil in setting of seizures. He is no longer on Elavil. Hospital discharge recommending outpatient follow-up for ambulatory EEG.   Patient was started on DAPT with aspirin and Plavix for 21 days then to continue aspirin thereafter with Lipitor for secondary stroke prevention. Carotid ultrasound ordered for stroke work-up. Repeat MRI considered. Right and left internal carotid arteries demonstrated 0 to 50% stenosis, bilateral vertebral arteries patent with flow in normal direction. He remains on aspirin and statin for secondary stroke prevention. He is going to start nicotine patch to help with smoking cessation. He denies having any seizures or seizure-like activity since initiation of Keppra. He is tolerating medication well. Current Outpatient Medications   Medication Sig Dispense Refill    levETIRAcetam (KEPPRA) 500 MG tablet Take 1 tablet by mouth 2 times daily 60 tablet 3    aspirin 81 MG chewable tablet Take 1 tablet by mouth daily 30 tablet 3    clopidogrel (PLAVIX) 75 MG tablet Take 1 tablet by mouth daily for 21 doses 21 tablet 0    VENTOLIN  (90 Base) MCG/ACT inhaler INHALE 2 PUFFS INTO LUNGS EVERY 4 HOURS AS NEEDED      vitamin D (ERGOCALCIFEROL) 1.25 MG (58937 UT) CAPS capsule TAKE ONE CAPSULE BY MOUTH WEEKLY      atorvastatin (LIPITOR) 80 MG tablet take one tablet by mouth EVERY DAY      carvedilol (COREG) 12.5 MG tablet take one tablet by mouth TWICE DAILY      TRELEGY ELLIPTA 100-62.5-25 MCG/INH AEPB INHALE 1 PUFF BY MOUTH ONCE EVERY DAY      hydroCHLOROthiazide (MICROZIDE) 12.5 MG capsule TAKE ONE CAPSULE BY MOUTH EVERY DAY      HM LORATADINE 10 MG tablet TAKE ONE TABLET BY MOUTH EVERY DAY      omega-3 acid ethyl esters (LOVAZA) 1 g capsule TAKE TWO CAPSULES BY MOUTH TWICE DAILY      omeprazole (PRILOSEC) 20 MG delayed release capsule omeprazole 20 mg capsule,delayed release      polyethylene glycol (GLYCOLAX) 17 GM/SCOOP powder polyethylene glycol 3350 17 gram/dose oral powder      Magnesium Oxide (MAG-OXIDE) 200 MG TABS Take 1 tablet by mouth daily for 5 days 5 tablet 0    PARoxetine (PAXIL) 10 MG/5ML suspension Take  by mouth every morning. No current facility-administered medications for this visit.        Physical Exam: Mental Status: A&O to self, location, month and year, NAD, speech clear, language fluent, repetition and naming intact, follows commands appropriately. Patient anxious, angry, sarcastic, disrespectful, argumentative. Cranial Nerve Exam:   CN II-XII: PERRL, VFF, no nystagmus, no gaze paresis, sensation V1-V3 intact b/l, muscles of facial expression symmetric; hearing intact to conversational tone, palate elevates symmetrically, shoulder elevation symmetric and tongue protrudes midline with movement side to side. Motor Exam:       Strength 5/5 UE's/LE's b/l  Tone and bulk normal   No pronator drift     Deep Tendon Reflexes: 2/4 biceps, triceps, brachioradialis, patellar, and achilles b/l; flexor plantar responses b/l     Sensation: Intact light touch/pinprick/vibration UE's/LE's b/l     Coordination/Cerebellum:       Tremors--none      Rapidly alternating movements: no dysdiadochokinesia b/l                Heel-to-Shin: no dysmetria b/l      Finger-to-Nose: no dysmetria b/l    Pulse 50   Ht 5' 6\" (1.676 m)   Wt 162 lb (73.5 kg)   SpO2 95%   BMI 26.15 kg/m²     Assessment and Plan     Diagnosis Orders   1. Seizures ([address]        2. Bipolar 1 disorder ([address]        3. Cerebrovascular accident (CVA), unspecified mechanism ([address]        4. Hypertension, unspecified type        5. Anxiety          Siria Walsh and his wife were extremely argumentative, disrespectful, mocking, accusing me of lying to them throughout our entire appointment. We were not able to get through the entire appointment due to the nature of the appointment. What I was able to ascertain is that patient is doing well on Keppra and has not had any further seizure activity and will remain on Keppra 500 mg twice daily. He was encouraged to follow-up with PCP for management of his mental health and discuss Xanax and any sleep concerns with PCP.   He continued, as well as wife, to berate me for not managing his mental health and providing him with a Xanax prescription. I will provide a 3-month refill on Keppra, any further refills will need to be provided by PCP who will take over management of seizures due to the disrespectful nature of this appointment. If patient decides they would like to continue to follow with us for management they will need to understand that this behavior will not be tolerated on any further appointments and if it is presented again they will then be discharged. I did have my manager speak with patient and his wife. It was decided that we would continue managing patient if patient would come to any further appointments without his wife and they would be no more disrespect or arguing during appointments. This will be discussed with Toño López, nurse practitioner who will see patient on any future follow-up appointments if she so chooses. If she disagrees, he will be referred back to PCP for management. Medications prescribed for the patient were discussed in detail. This included a discussion of the potential risks versus potential benefits of the medications. The patient was given time to ask questions and these were answered to the best of my ability. The patient appeared to understand the information provided. Return in about 3 months (around 6/14/2023).     ROSEMARY Becker NP

## 2023-07-12 NOTE — TELEPHONE ENCOUNTER
Requested Prescriptions     Pending Prescriptions Disp Refills    levETIRAcetam (KEPPRA) 500 MG tablet 60 tablet 3     Sig: Take 1 tablet by mouth 2 times daily

## 2023-07-13 RX ORDER — LEVETIRACETAM 500 MG/1
500 TABLET ORAL 2 TIMES DAILY
Qty: 60 TABLET | Refills: 3 | Status: SHIPPED | OUTPATIENT
Start: 2023-07-13

## 2023-08-03 ENCOUNTER — OFFICE VISIT (OUTPATIENT)
Dept: NEUROLOGY | Age: 63
End: 2023-08-03
Payer: COMMERCIAL

## 2023-08-03 VITALS
BODY MASS INDEX: 26.03 KG/M2 | HEIGHT: 66 IN | OXYGEN SATURATION: 98 % | HEART RATE: 58 BPM | SYSTOLIC BLOOD PRESSURE: 118 MMHG | DIASTOLIC BLOOD PRESSURE: 78 MMHG | WEIGHT: 162 LBS

## 2023-08-03 DIAGNOSIS — I63.9 CEREBROVASCULAR ACCIDENT (CVA), UNSPECIFIED MECHANISM (HCC): ICD-10-CM

## 2023-08-03 DIAGNOSIS — R56.9 SEIZURES (HCC): Primary | ICD-10-CM

## 2023-08-03 DIAGNOSIS — F31.9 BIPOLAR 1 DISORDER (HCC): ICD-10-CM

## 2023-08-03 PROCEDURE — 99214 OFFICE O/P EST MOD 30 MIN: CPT | Performed by: NURSE PRACTITIONER

## 2023-08-03 PROCEDURE — G8427 DOCREV CUR MEDS BY ELIG CLIN: HCPCS | Performed by: NURSE PRACTITIONER

## 2023-08-03 PROCEDURE — 3017F COLORECTAL CA SCREEN DOC REV: CPT | Performed by: NURSE PRACTITIONER

## 2023-08-03 PROCEDURE — 3078F DIAST BP <80 MM HG: CPT | Performed by: NURSE PRACTITIONER

## 2023-08-03 PROCEDURE — G8419 CALC BMI OUT NRM PARAM NOF/U: HCPCS | Performed by: NURSE PRACTITIONER

## 2023-08-03 PROCEDURE — 3074F SYST BP LT 130 MM HG: CPT | Performed by: NURSE PRACTITIONER

## 2023-08-03 PROCEDURE — 4004F PT TOBACCO SCREEN RCVD TLK: CPT | Performed by: NURSE PRACTITIONER

## 2023-08-03 RX ORDER — LAMOTRIGINE 25 MG/1
TABLET ORAL
Qty: 147 TABLET | Refills: 0 | Status: SHIPPED | OUTPATIENT
Start: 2023-08-03

## 2023-08-03 RX ORDER — LAMOTRIGINE 100 MG/1
100 TABLET ORAL 2 TIMES DAILY
Qty: 60 TABLET | Refills: 5 | Status: SHIPPED | OUTPATIENT
Start: 2023-08-03
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
## 2023-08-08 ENCOUNTER — TELEPHONE (OUTPATIENT)
Dept: NEUROLOGY | Age: 63
End: 2023-08-08

## 2023-08-08 NOTE — TELEPHONE ENCOUNTER
Pharmacist called wanting to verify the reasoning for the patient to be starting Lamictal and eventually d/c the Keppra. Went over office notes per provider patient reports an increase in irritability and so the provider had discussed and even wrote down the plan to start Lamictal and after a month to decrease Keppra to 500 mg for one week and then d/c. Pharmacist stated understanding and will explain it again to them.

## 2023-08-08 NOTE — TELEPHONE ENCOUNTER
Patient and his girlfriend called all confused stating they do not know why they need to change meds. Stated to them that at the appointment it was discussed with the patient and the provider that [address] maybe increasing the patient's irritability and that another seizure med within the family of them that can also help mood may be a better option. Stated that the provider would never change a med without the patient being agreeable first and making sure his seizures are still being treated. Patient states to girlfriend that yes he does now remember agreeing to it and now he just wishes he could have Xanax because this is all to complicated. Stated unfortunately we do not prescribe that, went over multiple times the directions of the titration dosing of the Lamictal and how to decrease the Keppra. Both patient and girlfriend stated understanding.

## 2023-08-11 ENCOUNTER — APPOINTMENT (OUTPATIENT)
Dept: CT IMAGING | Age: 63
End: 2023-08-11
Payer: COMMERCIAL

## 2023-08-11 ENCOUNTER — APPOINTMENT (OUTPATIENT)
Dept: GENERAL RADIOLOGY | Age: 63
End: 2023-08-11
Payer: COMMERCIAL

## 2023-08-11 ENCOUNTER — HOSPITAL ENCOUNTER (EMERGENCY)
Age: 63
Discharge: ANOTHER HOSPITAL | End: 2023-08-11
Attending: EMERGENCY MEDICINE
Payer: COMMERCIAL

## 2023-08-11 VITALS
WEIGHT: 162 LBS | HEART RATE: 61 BPM | OXYGEN SATURATION: 100 % | TEMPERATURE: 98.8 F | RESPIRATION RATE: 12 BRPM | DIASTOLIC BLOOD PRESSURE: 78 MMHG | BODY MASS INDEX: 26.15 KG/M2 | SYSTOLIC BLOOD PRESSURE: 132 MMHG

## 2023-08-11 DIAGNOSIS — Z86.73 HISTORY OF CVA (CEREBROVASCULAR ACCIDENT): ICD-10-CM

## 2023-08-11 DIAGNOSIS — E87.20 METABOLIC ACIDOSIS: ICD-10-CM

## 2023-08-11 DIAGNOSIS — G40.901 STATUS EPILEPTICUS (HCC): Primary | ICD-10-CM

## 2023-08-11 LAB
ALCOHOL SCREEN SERUM: <0.01 %WT/VOL
AMPHETAMINES: NEGATIVE
ANION GAP SERPL CALCULATED.3IONS-SCNC: 30 MMOL/L (ref 4–16)
APTT: 23.7 SECONDS (ref 25.1–37.1)
BACTERIA: NEGATIVE /HPF
BARBITURATE SCREEN URINE: NEGATIVE
BASE EXCESS MIXED: 4 (ref 0–1.2)
BASE EXCESS: 18 (ref 0–3.3)
BASOPHILS ABSOLUTE: 0.1 K/CU MM
BASOPHILS RELATIVE PERCENT: 0.6 % (ref 0–1)
BENZODIAZEPINE SCREEN, URINE: ABNORMAL
BILIRUBIN URINE: NEGATIVE MG/DL
BLOOD, URINE: ABNORMAL
BUN SERPL-MCNC: 9 MG/DL (ref 6–23)
CALCIUM SERPL-MCNC: 9.5 MG/DL (ref 8.3–10.6)
CANNABINOID SCREEN URINE: ABNORMAL
CHLORIDE BLD-SCNC: 96 MMOL/L (ref 99–110)
CLARITY: CLEAR
CO2: 13 MMOL/L (ref 21–32)
COCAINE METABOLITE: NEGATIVE
COLOR: YELLOW
COMMENT: ABNORMAL
CREAT SERPL-MCNC: 1.5 MG/DL (ref 0.9–1.3)
DIFFERENTIAL TYPE: ABNORMAL
EKG ATRIAL RATE: 88 BPM
EKG DIAGNOSIS: NORMAL
EKG P-R INTERVAL: 240 MS
EKG Q-T INTERVAL: 418 MS
EKG QRS DURATION: 86 MS
EKG QTC CALCULATION (BAZETT): 505 MS
EKG R AXIS: -25 DEGREES
EKG T AXIS: -48 DEGREES
EKG VENTRICULAR RATE: 88 BPM
EOSINOPHILS ABSOLUTE: 0.3 K/CU MM
EOSINOPHILS RELATIVE PERCENT: 1.9 % (ref 0–3)
FENTANYL URINE: NEGATIVE
GFR SERPL CREATININE-BSD FRML MDRD: 52 ML/MIN/1.73M2
GLUCOSE BLD-MCNC: 196 MG/DL (ref 70–99)
GLUCOSE SERPL-MCNC: 197 MG/DL (ref 70–99)
GLUCOSE, URINE: NEGATIVE MG/DL
HCO3 VENOUS: 13 MMOL/L (ref 19–25)
HCO3 VENOUS: 29.7 MMOL/L (ref 19–25)
HCT VFR BLD CALC: 40.5 % (ref 42–52)
HEMOGLOBIN: 12.2 GM/DL (ref 13.5–18)
IMMATURE NEUTROPHIL %: 0.8 % (ref 0–0.43)
INR BLD: 1.1 INDEX
KETONES, URINE: NEGATIVE MG/DL
LACTATE: 18.4 MMOL/L (ref 0.5–1.9)
LEUKOCYTE ESTERASE, URINE: NEGATIVE
LYMPHOCYTES ABSOLUTE: 6.1 K/CU MM
LYMPHOCYTES RELATIVE PERCENT: 41.5 % (ref 24–44)
MAGNESIUM: 1.7 MG/DL (ref 1.8–2.4)
MCH RBC QN AUTO: 28.9 PG (ref 27–31)
MCHC RBC AUTO-ENTMCNC: 30.1 % (ref 32–36)
MCV RBC AUTO: 96 FL (ref 78–100)
MONOCYTES ABSOLUTE: 1.1 K/CU MM
MONOCYTES RELATIVE PERCENT: 7.5 % (ref 0–4)
NITRITE URINE, QUANTITATIVE: NEGATIVE
NUCLEATED RBC %: 0 %
O2 SAT, VEN: 82.3 % (ref 50–70)
O2 SAT, VEN: 90.2 % (ref 50–70)
OPIATES, URINE: NEGATIVE
OXYCODONE, OPI5M: NEGATIVE
PCO2, VEN: 48 MMHG (ref 38–52)
PCO2, VEN: 48 MMHG (ref 38–52)
PDW BLD-RTO: 14.7 % (ref 11.7–14.9)
PH VENOUS: 7.04 (ref 7.32–7.42)
PH VENOUS: 7.4 (ref 7.32–7.42)
PH, URINE: 7.5 (ref 5–8)
PLATELET # BLD: 323 K/CU MM (ref 140–440)
PMV BLD AUTO: 9.4 FL (ref 7.5–11.1)
PO2, VEN: 101 MMHG (ref 28–48)
PO2, VEN: 52 MMHG (ref 28–48)
POTASSIUM SERPL-SCNC: 3.9 MMOL/L (ref 3.5–5.1)
PROTEIN UA: NEGATIVE MG/DL
PROTHROMBIN TIME: 14.4 SECONDS (ref 11.7–14.5)
RBC # BLD: 4.22 M/CU MM (ref 4.6–6.2)
RBC URINE: 2 /HPF (ref 0–3)
SEGMENTED NEUTROPHILS ABSOLUTE COUNT: 7 K/CU MM
SEGMENTED NEUTROPHILS RELATIVE PERCENT: 47.7 % (ref 36–66)
SODIUM BLD-SCNC: 139 MMOL/L (ref 135–145)
SPECIFIC GRAVITY UA: 1.01 (ref 1–1.03)
SQUAMOUS EPITHELIAL: <1 /HPF
TOTAL IMMATURE NEUTOROPHIL: 0.12 K/CU MM
TOTAL NUCLEATED RBC: 0 K/CU MM
TRICHOMONAS: NORMAL /HPF
TROPONIN T: <0.01 NG/ML
UROBILINOGEN, URINE: 0.2 MG/DL (ref 0.2–1)
WBC # BLD: 14.7 K/CU MM (ref 4–10.5)
WBC UA: 1 /HPF (ref 0–2)

## 2023-08-11 PROCEDURE — 83735 ASSAY OF MAGNESIUM: CPT

## 2023-08-11 PROCEDURE — 96375 TX/PRO/DX INJ NEW DRUG ADDON: CPT

## 2023-08-11 PROCEDURE — 80307 DRUG TEST PRSMV CHEM ANLYZR: CPT

## 2023-08-11 PROCEDURE — 72125 CT NECK SPINE W/O DYE: CPT

## 2023-08-11 PROCEDURE — 82962 GLUCOSE BLOOD TEST: CPT

## 2023-08-11 PROCEDURE — 85025 COMPLETE CBC W/AUTO DIFF WBC: CPT

## 2023-08-11 PROCEDURE — 6360000002 HC RX W HCPCS

## 2023-08-11 PROCEDURE — 96365 THER/PROPH/DIAG IV INF INIT: CPT

## 2023-08-11 PROCEDURE — 6360000002 HC RX W HCPCS: Performed by: EMERGENCY MEDICINE

## 2023-08-11 PROCEDURE — 99285 EMERGENCY DEPT VISIT HI MDM: CPT

## 2023-08-11 PROCEDURE — 31500 INSERT EMERGENCY AIRWAY: CPT

## 2023-08-11 PROCEDURE — 96366 THER/PROPH/DIAG IV INF ADDON: CPT

## 2023-08-11 PROCEDURE — 85610 PROTHROMBIN TIME: CPT

## 2023-08-11 PROCEDURE — 2580000003 HC RX 258: Performed by: EMERGENCY MEDICINE

## 2023-08-11 PROCEDURE — 82805 BLOOD GASES W/O2 SATURATION: CPT

## 2023-08-11 PROCEDURE — 83605 ASSAY OF LACTIC ACID: CPT

## 2023-08-11 PROCEDURE — 93010 ELECTROCARDIOGRAM REPORT: CPT | Performed by: INTERNAL MEDICINE

## 2023-08-11 PROCEDURE — G0480 DRUG TEST DEF 1-7 CLASSES: HCPCS

## 2023-08-11 PROCEDURE — 71045 X-RAY EXAM CHEST 1 VIEW: CPT

## 2023-08-11 PROCEDURE — 95705 EEG W/O VID 2-12 HR UNMNTR: CPT

## 2023-08-11 PROCEDURE — 70450 CT HEAD/BRAIN W/O DYE: CPT

## 2023-08-11 PROCEDURE — 2500000003 HC RX 250 WO HCPCS

## 2023-08-11 PROCEDURE — 36556 INSERT NON-TUNNEL CV CATH: CPT

## 2023-08-11 PROCEDURE — 81001 URINALYSIS AUTO W/SCOPE: CPT

## 2023-08-11 PROCEDURE — 95717 EEG PHYS/QHP 2-12 HR W/O VID: CPT | Performed by: STUDENT IN AN ORGANIZED HEALTH CARE EDUCATION/TRAINING PROGRAM

## 2023-08-11 PROCEDURE — 2700000000 HC OXYGEN THERAPY PER DAY

## 2023-08-11 PROCEDURE — 84484 ASSAY OF TROPONIN QUANT: CPT

## 2023-08-11 PROCEDURE — 80048 BASIC METABOLIC PNL TOTAL CA: CPT

## 2023-08-11 PROCEDURE — 96368 THER/DIAG CONCURRENT INF: CPT

## 2023-08-11 PROCEDURE — 85730 THROMBOPLASTIN TIME PARTIAL: CPT

## 2023-08-11 PROCEDURE — 93005 ELECTROCARDIOGRAM TRACING: CPT | Performed by: EMERGENCY MEDICINE

## 2023-08-11 PROCEDURE — 2500000003 HC RX 250 WO HCPCS: Performed by: EMERGENCY MEDICINE

## 2023-08-11 RX ORDER — SODIUM CHLORIDE, SODIUM LACTATE, POTASSIUM CHLORIDE, CALCIUM CHLORIDE 600; 310; 30; 20 MG/100ML; MG/100ML; MG/100ML; MG/100ML
INJECTION, SOLUTION INTRAVENOUS ONCE
Status: DISCONTINUED | OUTPATIENT
Start: 2023-08-11 | End: 2023-08-11 | Stop reason: HOSPADM

## 2023-08-11 RX ORDER — LORAZEPAM 2 MG/ML
4 INJECTION INTRAMUSCULAR ONCE
Status: COMPLETED | OUTPATIENT
Start: 2023-08-11 | End: 2023-08-11

## 2023-08-11 RX ORDER — FENTANYL CITRATE-0.9 % NACL/PF 10 MCG/ML
25-200 PLASTIC BAG, INJECTION (ML) INTRAVENOUS CONTINUOUS
Status: DISCONTINUED | OUTPATIENT
Start: 2023-08-11 | End: 2023-08-11 | Stop reason: HOSPADM

## 2023-08-11 RX ORDER — SUCCINYLCHOLINE/SOD CL,ISO/PF 100 MG/5ML
100 SYRINGE (ML) INTRAVENOUS ONCE
Status: COMPLETED | OUTPATIENT
Start: 2023-08-11 | End: 2023-08-11

## 2023-08-11 RX ORDER — KETAMINE HYDROCHLORIDE 10 MG/ML
INJECTION INTRAMUSCULAR; INTRAVENOUS
Status: COMPLETED
Start: 2023-08-11 | End: 2023-08-11

## 2023-08-11 RX ORDER — 0.9 % SODIUM CHLORIDE 0.9 %
1000 INTRAVENOUS SOLUTION INTRAVENOUS ONCE
Status: COMPLETED | OUTPATIENT
Start: 2023-08-11 | End: 2023-08-11

## 2023-08-11 RX ORDER — PROPOFOL 10 MG/ML
5-50 INJECTION, EMULSION INTRAVENOUS CONTINUOUS
Status: DISCONTINUED | OUTPATIENT
Start: 2023-08-11 | End: 2023-08-11 | Stop reason: HOSPADM

## 2023-08-11 RX ORDER — KETAMINE HYDROCHLORIDE 10 MG/ML
200 INJECTION INTRAMUSCULAR; INTRAVENOUS ONCE
Status: COMPLETED | OUTPATIENT
Start: 2023-08-11 | End: 2023-08-11

## 2023-08-11 RX ORDER — SODIUM CHLORIDE, SODIUM LACTATE, POTASSIUM CHLORIDE, CALCIUM CHLORIDE 600; 310; 30; 20 MG/100ML; MG/100ML; MG/100ML; MG/100ML
INJECTION, SOLUTION INTRAVENOUS ONCE
Status: COMPLETED | OUTPATIENT
Start: 2023-08-11 | End: 2023-08-11

## 2023-08-11 RX ADMIN — Medication 100 MG: at 06:10

## 2023-08-11 RX ADMIN — KETAMINE HYDROCHLORIDE 200 MG: 10 INJECTION, SOLUTION INTRAMUSCULAR; INTRAVENOUS at 06:09

## 2023-08-11 RX ADMIN — KETAMINE HYDROCHLORIDE 200 MG: 10 INJECTION INTRAMUSCULAR; INTRAVENOUS at 06:09

## 2023-08-11 RX ADMIN — Medication 50 MCG/HR: at 06:48

## 2023-08-11 RX ADMIN — SODIUM CHLORIDE 1000 ML: 9 INJECTION, SOLUTION INTRAVENOUS at 05:54

## 2023-08-11 RX ADMIN — LORAZEPAM 4 MG: 2 INJECTION INTRAMUSCULAR; INTRAVENOUS at 05:04

## 2023-08-11 RX ADMIN — PROPOFOL 20 MCG/KG/MIN: 10 INJECTION, EMULSION INTRAVENOUS at 06:44

## 2023-08-11 RX ADMIN — LEVETIRACETAM 4410 MG: 100 INJECTION, SOLUTION INTRAVENOUS at 05:27

## 2023-08-11 RX ADMIN — SODIUM CHLORIDE 1000 ML: 9 INJECTION, SOLUTION INTRAVENOUS at 05:10

## 2023-08-11 RX ADMIN — SODIUM CHLORIDE, POTASSIUM CHLORIDE, SODIUM LACTATE AND CALCIUM CHLORIDE: 600; 310; 30; 20 INJECTION, SOLUTION INTRAVENOUS at 06:37

## 2023-08-11 ASSESSMENT — PULMONARY FUNCTION TESTS: PIF_VALUE: 18

## 2023-08-11 NOTE — ED NOTES
Report to Camilla Musa at [facility].      Jayde Byers RN  08/11/23 [id number]
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
## 2023-08-11 NOTE — ED NOTES
Called [facility] to transfer patient, spoke with Valeria Yepez in transfer center. [facility] is at capacity.  [facility] reaching out to [id number] [facility]  08/11/23 [id number]

## 2023-08-11 NOTE — PROCEDURES
[facility]    Identifying Information:  Name: Margaret Gage  MRN: [id number]  : 1960  Interpreting Physician: Antonietta Bucio DO  Referring Provider: Jaunita Cushing, DO      Clinical History:  Margaret Gage is an 57 y.o. male with concerns for seizure like activity.      Past Medical History:  Past Medical History:   Diagnosis Date    Bipolar 1 disorder ([address])     COPD (chronic obstructive pulmonary disease) (HCC)     Hyperlipidemia     Hypertension     Seizures (HCC)         Current Medications:     Current Outpatient Medications   Medication Instructions    aspirin 81 mg, Oral, DAILY    atorvastatin (LIPITOR) 80 MG tablet take one tablet by mouth EVERY DAY    carvedilol (COREG) 12.5 MG tablet take one tablet by mouth TWICE DAILY    clopidogrel (PLAVIX) 75 mg, Oral, DAILY    HM LORATADINE 10 MG tablet TAKE ONE TABLET BY MOUTH EVERY DAY    hydroCHLOROthiazide (MICROZIDE) 12.5 MG capsule TAKE ONE CAPSULE BY MOUTH EVERY DAY    lamoTRIgine (LAMICTAL) 25 MG tablet 1 tab QHS x 7d;1 tab BID x 7d;1 tab QAM & 2 tab QHS x 7d;2 tab BID x 7d;2 tab QAM & 3 tab QHS x 7d;3 tab BID x 7d; 3 tabs QAM & 4 tabs QHS; then 100mg RX.    lamoTRIgine (LAMICTAL) 100 mg, Oral, 2 TIMES DAILY, NOTE TO PHARMACY: DO NOT FILL UNTIL 50 MG RX IS COMPLETE    levETIRAcetam (KEPPRA) 500 mg, Oral, 2 TIMES DAILY    Magnesium Oxide (MAG-OXIDE) 200 MG TABS 1 tablet, Oral, DAILY    omega-3 acid ethyl esters (LOVAZA) 1 g capsule TAKE TWO CAPSULES BY MOUTH TWICE DAILY    omeprazole (PRILOSEC) 20 MG delayed release capsule omeprazole 20 mg capsule,delayed release    PARoxetine (PAXIL) 10 MG/5ML suspension EVERY MORNING    polyethylene glycol (GLYCOLAX) 17 GM/SCOOP powder polyethylene glycol 3350 17 gram/dose oral powder    TRELEGY ELLIPTA 100-62.5-25 MCG/INH AEPB INHALE 1 PUFF BY MOUTH ONCE EVERY DAY    VENTOLIN  (90 Base) MCG/ACT inhaler INHALE 2 PUFFS INTO LUNGS EVERY 4 HOURS AS NEEDED    vitamin D

## 2023-08-11 NOTE — ED NOTES
Dr Robert Kennedy accepted pt at [facility] to set up KAL Jones  08/11/23 0357

## 2023-08-11 NOTE — ED NOTES
Called [facility] and spoke with kirk Reference Number [id number]     Sharri Márquez RN  08/11/23 1640

## 2023-09-18 ENCOUNTER — TELEPHONE (OUTPATIENT)
Dept: NEUROLOGY | Age: 63
End: 2023-09-18

## 2023-09-18 NOTE — TELEPHONE ENCOUNTER
Pt called and stated he would like to get back on keppra d/t having seizures. Pt stated he would deal with the irritability.

## 2023-09-22 RX ORDER — LEVETIRACETAM 500 MG/1
500 TABLET ORAL 2 TIMES DAILY
Qty: 60 TABLET | Refills: 3 | Status: SHIPPED | OUTPATIENT
Start: 2023-09-22

## 2023-11-03 ENCOUNTER — OFFICE VISIT (OUTPATIENT)
Dept: NEUROLOGY | Age: 63
End: 2023-11-03
Payer: COMMERCIAL

## 2023-11-03 VITALS
HEIGHT: 66 IN | OXYGEN SATURATION: 97 % | WEIGHT: 161 LBS | SYSTOLIC BLOOD PRESSURE: 130 MMHG | BODY MASS INDEX: 25.88 KG/M2 | DIASTOLIC BLOOD PRESSURE: 90 MMHG | HEART RATE: 71 BPM

## 2023-11-03 DIAGNOSIS — F31.9 BIPOLAR 1 DISORDER (HCC): ICD-10-CM

## 2023-11-03 DIAGNOSIS — R56.9 SEIZURES (HCC): Primary | ICD-10-CM

## 2023-11-03 PROCEDURE — 1123F ACP DISCUSS/DSCN MKR DOCD: CPT | Performed by: NURSE PRACTITIONER

## 2023-11-03 PROCEDURE — 99213 OFFICE O/P EST LOW 20 MIN: CPT | Performed by: NURSE PRACTITIONER

## 2023-11-03 PROCEDURE — 3078F DIAST BP <80 MM HG: CPT | Performed by: NURSE PRACTITIONER

## 2023-11-03 PROCEDURE — 4004F PT TOBACCO SCREEN RCVD TLK: CPT | Performed by: NURSE PRACTITIONER

## 2023-11-03 PROCEDURE — G8419 CALC BMI OUT NRM PARAM NOF/U: HCPCS | Performed by: NURSE PRACTITIONER

## 2023-11-03 PROCEDURE — G8484 FLU IMMUNIZE NO ADMIN: HCPCS | Performed by: NURSE PRACTITIONER

## 2023-11-03 PROCEDURE — G8427 DOCREV CUR MEDS BY ELIG CLIN: HCPCS | Performed by: NURSE PRACTITIONER

## 2023-11-03 PROCEDURE — 3017F COLORECTAL CA SCREEN DOC REV: CPT | Performed by: NURSE PRACTITIONER

## 2023-11-03 PROCEDURE — 3074F SYST BP LT 130 MM HG: CPT | Performed by: NURSE PRACTITIONER

## 2023-11-03 RX ORDER — LEVETIRACETAM 500 MG/1
500 TABLET ORAL 2 TIMES DAILY
Qty: 60 TABLET | Refills: 3 | Status: SHIPPED | OUTPATIENT
Start: 2023-11-03

## 2023-11-03 RX ORDER — CLONAZEPAM 1 MG/1
1 TABLET ORAL 2 TIMES DAILY
COMMUNITY
Start: 2023-10-23
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
## 2024-05-08 ENCOUNTER — OFFICE VISIT (OUTPATIENT)
Dept: NEUROLOGY | Age: 64
End: 2024-05-08
Payer: COMMERCIAL

## 2024-05-08 VITALS
HEART RATE: 51 BPM | WEIGHT: 133.8 LBS | OXYGEN SATURATION: 96 % | BODY MASS INDEX: 21.6 KG/M2 | SYSTOLIC BLOOD PRESSURE: 110 MMHG | DIASTOLIC BLOOD PRESSURE: 64 MMHG

## 2024-05-08 DIAGNOSIS — R56.9 SEIZURES (HCC): ICD-10-CM

## 2024-05-08 PROCEDURE — 99213 OFFICE O/P EST LOW 20 MIN: CPT | Performed by: NURSE PRACTITIONER

## 2024-05-08 PROCEDURE — 3078F DIAST BP <80 MM HG: CPT | Performed by: NURSE PRACTITIONER

## 2024-05-08 PROCEDURE — 3017F COLORECTAL CA SCREEN DOC REV: CPT | Performed by: NURSE PRACTITIONER

## 2024-05-08 PROCEDURE — G8420 CALC BMI NORM PARAMETERS: HCPCS | Performed by: NURSE PRACTITIONER

## 2024-05-08 PROCEDURE — 1123F ACP DISCUSS/DSCN MKR DOCD: CPT | Performed by: NURSE PRACTITIONER

## 2024-05-08 PROCEDURE — G8427 DOCREV CUR MEDS BY ELIG CLIN: HCPCS | Performed by: NURSE PRACTITIONER

## 2024-05-08 PROCEDURE — 4004F PT TOBACCO SCREEN RCVD TLK: CPT | Performed by: NURSE PRACTITIONER

## 2024-05-08 PROCEDURE — 3074F SYST BP LT 130 MM HG: CPT | Performed by: NURSE PRACTITIONER

## 2024-05-08 RX ORDER — CLONIDINE 0.2 MG/24H
1 PATCH, EXTENDED RELEASE TRANSDERMAL WEEKLY
COMMUNITY

## 2024-05-08 RX ORDER — QUETIAPINE FUMARATE 25 MG/1
25 TABLET, FILM COATED ORAL 2 TIMES DAILY
COMMUNITY
Start: 2024-04-11

## 2024-05-08 RX ORDER — LEVETIRACETAM 500 MG/1
500 TABLET ORAL 2 TIMES DAILY
Qty: 180 TABLET | Refills: 3 | Status: SHIPPED | OUTPATIENT
Start: 2024-05-08

## 2024-05-08 NOTE — PROGRESS NOTES
EVERY DAY      HM LORATADINE 10 MG tablet TAKE ONE TABLET BY MOUTH EVERY DAY      omega-3 acid ethyl esters (LOVAZA) 1 g capsule TAKE TWO CAPSULES BY MOUTH TWICE DAILY      omeprazole (PRILOSEC) 20 MG delayed release capsule omeprazole 20 mg capsule,delayed release      polyethylene glycol (GLYCOLAX) 17 GM/SCOOP powder polyethylene glycol 3350 17 gram/dose oral powder      PARoxetine (PAXIL) 10 MG/5ML suspension Take  by mouth every morning.      cloNIDine (CATAPRES) 0.2 MG/24HR PTWK Place 1 patch onto the skin once a week      aspirin 81 MG chewable tablet Take 1 tablet by mouth daily (Patient not taking: Reported on 5/8/2024) 30 tablet 3    clopidogrel (PLAVIX) 75 MG tablet Take 1 tablet by mouth daily for 21 doses 21 tablet 0    vitamin D (ERGOCALCIFEROL) 1.25 MG (37219 UT) CAPS capsule TAKE ONE CAPSULE BY MOUTH WEEKLY (Patient not taking: Reported on 5/8/2024)      Magnesium Oxide (MAG-OXIDE) 200 MG TABS Take 1 tablet by mouth daily for 5 days 5 tablet 0     No current facility-administered medications for this visit.       Physical Exam:  Mental Status              Orientation: oriented to person, oriented to place, oriented to problem, and oriented to time                        Mood/affectappropriate mood and appropriate affect              Memory/Other: recent memory intact, remote memory intact, fund of knowledge intact, attention span normal, and concentration normal  Language  Language: (normal) language, no dysarthria, (normal) articulation, and no dysphasia/aphasia  Cranial Nerves              Eyes: pupils normal size and reactive to light and visual fields appear full              CN III, IV, VI : extraocular muscle strength normal, normal pursuit, no nystagmus, and no ptosis              Facial Motor: normal facial motor              CN XII: tongue protrudes midline  Motor/Coordination Exam              Power: motor strength appears intact throughout, no arm drift, and normal tone

## 2024-10-31 ENCOUNTER — TRANSCRIBE ORDERS (OUTPATIENT)
Dept: ADMINISTRATIVE | Age: 64
End: 2024-10-31

## 2024-10-31 DIAGNOSIS — Z87.891 PERSONAL HISTORY OF TOBACCO USE, PRESENTING HAZARDS TO HEALTH: ICD-10-CM

## 2024-10-31 DIAGNOSIS — F17.210 CIGARETTE SMOKER: Primary | ICD-10-CM

## 2024-12-09 ENCOUNTER — HOSPITAL ENCOUNTER (OUTPATIENT)
Dept: CT IMAGING | Age: 64
Discharge: HOME | End: 2024-12-09
Payer: COMMERCIAL

## 2024-12-09 DIAGNOSIS — F17.210 CIGARETTE SMOKER: ICD-10-CM

## 2024-12-09 DIAGNOSIS — Z87.891 PERSONAL HISTORY OF TOBACCO USE, PRESENTING HAZARDS TO HEALTH: ICD-10-CM

## 2024-12-09 PROCEDURE — 71271 CT THORAX LUNG CANCER SCR C-: CPT

## 2025-03-03 DIAGNOSIS — R56.9 SEIZURES (HCC): ICD-10-CM

## 2025-03-03 RX ORDER — LEVETIRACETAM 500 MG/1
500 TABLET ORAL 2 TIMES DAILY
Qty: 180 TABLET | Refills: 3 | Status: SHIPPED | OUTPATIENT
Start: 2025-03-03

## 2025-03-26 ENCOUNTER — HOSPITAL ENCOUNTER (OUTPATIENT)
Age: 65
Discharge: HOME | End: 2025-03-26
Payer: COMMERCIAL

## 2025-03-26 LAB
25(OH)D3 SERPL-MCNC: 79.6 NG/ML (ref 30–150)
ALBUMIN SERPL-MCNC: 4.3 G/DL (ref 3.4–5)
ALBUMIN/GLOB SERPL: 1.5 {RATIO} (ref 1.1–2.2)
ALP SERPL-CCNC: 110 U/L (ref 40–129)
ALT SERPL-CCNC: 16 U/L (ref 10–40)
ANION GAP SERPL CALCULATED.3IONS-SCNC: 10 MMOL/L (ref 9–17)
AST SERPL-CCNC: 19 U/L (ref 15–37)
BASOPHILS # BLD: 0.07 K/UL
BASOPHILS NFR BLD: 1 % (ref 0–1)
BILIRUB SERPL-MCNC: 0.3 MG/DL (ref 0–1)
BUN SERPL-MCNC: 7 MG/DL (ref 7–20)
CALCIUM SERPL-MCNC: 9.5 MG/DL (ref 8.3–10.6)
CHLORIDE SERPL-SCNC: 104 MMOL/L (ref 99–110)
CHOLEST SERPL-MCNC: 167 MG/DL (ref 125–199)
CO2 SERPL-SCNC: 28 MMOL/L (ref 21–32)
CREAT SERPL-MCNC: 1.2 MG/DL (ref 0.8–1.3)
EOSINOPHIL # BLD: 0.24 K/UL
EOSINOPHILS RELATIVE PERCENT: 2 % (ref 0–3)
ERYTHROCYTE [DISTWIDTH] IN BLOOD BY AUTOMATED COUNT: 12.9 % (ref 11.7–14.9)
GFR, ESTIMATED: 59 ML/MIN/1.73M2
GLUCOSE SERPL-MCNC: 102 MG/DL (ref 74–99)
HCT VFR BLD AUTO: 45.1 % (ref 42–52)
HDLC SERPL-MCNC: 39 MG/DL
HGB BLD-MCNC: 14.7 G/DL (ref 13.5–18)
IMM GRANULOCYTES # BLD AUTO: 0.12 K/UL
IMM GRANULOCYTES NFR BLD: 1 %
LDLC SERPL CALC-MCNC: 88 MG/DL
LYMPHOCYTES NFR BLD: 2.49 K/UL
LYMPHOCYTES RELATIVE PERCENT: 23 % (ref 24–44)
MCH RBC QN AUTO: 30.6 PG (ref 27–31)
MCHC RBC AUTO-ENTMCNC: 32.6 G/DL (ref 32–36)
MCV RBC AUTO: 94 FL (ref 78–100)
MONOCYTES NFR BLD: 0.91 K/UL
MONOCYTES NFR BLD: 9 % (ref 0–4)
NEUTROPHILS NFR BLD: 64 % (ref 36–66)
NEUTS SEG NFR BLD: 6.86 K/UL
PLATELET # BLD AUTO: 308 K/UL (ref 140–440)
PMV BLD AUTO: 9.7 FL (ref 7.5–11.1)
POTASSIUM SERPL-SCNC: 4.4 MMOL/L (ref 3.5–5.1)
PROT SERPL-MCNC: 7.2 G/DL (ref 6.4–8.2)
RBC # BLD AUTO: 4.8 M/UL (ref 4.6–6.2)
SODIUM SERPL-SCNC: 142 MMOL/L (ref 136–145)
TRIGL SERPL-MCNC: 200 MG/DL
TSH SERPL DL<=0.05 MIU/L-ACNC: 2.11 UIU/ML (ref 0.27–4.2)
WBC OTHER # BLD: 10.7 K/UL (ref 4–10.5)

## 2025-03-26 PROCEDURE — 80053 COMPREHEN METABOLIC PANEL: CPT

## 2025-03-26 PROCEDURE — 84443 ASSAY THYROID STIM HORMONE: CPT

## 2025-03-26 PROCEDURE — 85025 COMPLETE CBC W/AUTO DIFF WBC: CPT

## 2025-03-26 PROCEDURE — 80061 LIPID PANEL: CPT

## 2025-03-26 PROCEDURE — 82306 VITAMIN D 25 HYDROXY: CPT
